# Patient Record
Sex: FEMALE | Race: BLACK OR AFRICAN AMERICAN | Employment: FULL TIME
[De-identification: names, ages, dates, MRNs, and addresses within clinical notes are randomized per-mention and may not be internally consistent; named-entity substitution may affect disease eponyms.]

---

## 2023-07-17 ENCOUNTER — HOSPITAL ENCOUNTER (OUTPATIENT)
Facility: HOSPITAL | Age: 63
Setting detail: RECURRING SERIES
Discharge: HOME | End: 2023-07-20
Payer: MEDICAID

## 2023-07-17 PROCEDURE — 97162 PT EVAL MOD COMPLEX 30 MIN: CPT

## 2023-07-17 NOTE — PROGRESS NOTES
PHYSICAL / OCCUPATIONAL THERAPY - DAILY TREATMENT NOTE (updated )  For Eval visit    Patient Name: Raúl No    Date: 2023    : 1960  Insurance: Payor: Barbara Re / Plan: Clare SAmos Castro / Product Type: *No Product type* /      Patient  verified yes     Visit #   Current / Total 1 12   Time   In / Out 2:30 3:00   Pain   In / Out 0 0   Subjective Functional Status/Changes: See POC     TREATMENT AREA =  Other low back pain [M54.59]    OBJECTIVE           30 min   Eval - untimed                      Therapeutic Procedures: Tx Min Billable or 1:1 Min (if diff from Tx Min) Procedure, Rationale, Specifics     77841 Therapeutic Exercise (timed):  increase ROM, strength, coordination, balance, and proprioception to improve patient's ability to progress to PLOF and address remaining functional goals. (see flow sheet as applicable)     Details if applicable:       36220 Neuromuscular Re-Education (timed):  improve balance, coordination, kinesthetic sense, posture, core stability and proprioception to improve patient's ability to develop conscious control of individual muscles and awareness of position of extremities in order to progress to PLOF and address remaining functional goals. (see flow sheet as applicable)     Details if applicable:       18562 Manual Therapy (timed):  decrease pain, increase ROM, increase tissue extensibility, and decrease trigger points to improve patient's ability to progress to PLOF and address remaining functional goals. The manual therapy interventions were performed at a separate and distinct time from the therapeutic activities interventions .  (see flow sheet as applicable)     Details if applicable:       94792 Self Care/Home Management (timed):  improve patient knowledge and understanding of pain reducing techniques, positioning, posture/ergonomics, home safety, activity modification, diagnosis/prognosis, and physical therapy expectations, procedures and

## 2023-07-17 NOTE — THERAPY EVALUATION
limited with pain, SB 50% limited bilat with pain, Rot R 75% limited with pain L 50% limited with pain. Strength: Hip Flexion 4+/5 bialt , ABD 4-/5 bilat, Extension 4-/5 bilat; TA draw poor  Current deficits include decreased spinal mobility with increased tissue tightness leading to pain with repetitive bending activities. Pt will benefit from a comprehensive POC/HEP to address impairments and restore function in order to return to prior level of function and prevent secondary impairments. Evaluation Complexity:  History:  HIGH Complexity :3+ comorbidities / personal factors will impact the outcome/ POC ; Examination:  HIGH Complexity : 4+ Standardized tests and measures addressing body structure, function, activity limitation and / or participation in recreation  ;Presentation:  MEDIUM Complexity : Evolving with changing characteristics  ; Clinical Decision Making:  MEDIUM Complexity : FOTO score of 26-74 FOTO score = an established functional score where 100 = no disability  Overall Complexity Rating: MEDIUM  Problem List: pain affecting function, decrease ROM, decrease strength, impaired gait/balance, decrease ADL/functional abilities, decrease activity tolerance, decrease flexibility/joint mobility, and decrease transfer abilities    Treatment Plan may include any combination of the followin Therapeutic Exercise, 49871 Neuromuscular Re-Education, 97744 Manual Therapy, 09106 Therapeutic Activity, 48026 Self Care/Home Management, 21413 Electrical Stim unattended, [id number] Electrical Stim attended, 88121 Vasopneumatic Device, [id number] Aquatic Therapy, [id number] Gait Training, [id number] Ultrasound, [id number] Mechanical Traction, [id number] Needle Insertion w/o Injection (1 or 2 muscles), 38267 Needle Insertion w/o Injection (3+ muscles), 76756 Canalith Repositioning, and 98829 Orthotic Management and Training  Patient / Family readiness to learn indicated by: asking questions, trying to perform skills, interest, return

## 2023-07-19 ENCOUNTER — HOSPITAL ENCOUNTER (OUTPATIENT)
Facility: HOSPITAL | Age: 63
Setting detail: RECURRING SERIES
Discharge: HOME | End: 2023-07-22
Payer: MEDICAID

## 2023-07-19 PROCEDURE — G0283 ELEC STIM OTHER THAN WOUND: HCPCS

## 2023-07-19 PROCEDURE — 97535 SELF CARE MNGMENT TRAINING: CPT

## 2023-07-19 PROCEDURE — 97110 THERAPEUTIC EXERCISES: CPT

## 2023-07-19 PROCEDURE — 97112 NEUROMUSCULAR REEDUCATION: CPT

## 2023-07-24 ENCOUNTER — HOSPITAL ENCOUNTER (OUTPATIENT)
Facility: HOSPITAL | Age: 63
Setting detail: RECURRING SERIES
Discharge: HOME | End: 2023-07-27
Payer: MEDICAID

## 2023-07-24 PROCEDURE — 97112 NEUROMUSCULAR REEDUCATION: CPT

## 2023-07-24 PROCEDURE — 97110 THERAPEUTIC EXERCISES: CPT

## 2023-07-24 PROCEDURE — G0283 ELEC STIM OTHER THAN WOUND: HCPCS

## 2023-07-24 PROCEDURE — 97530 THERAPEUTIC ACTIVITIES: CPT

## 2023-07-24 NOTE — PROGRESS NOTES
Instructed in lifting mechanics with pt requiring VC, demonstration, and TC. Good effort with session. Ended with MHP and estim per pt request.    Patient will continue to benefit from skilled PT / OT services to modify and progress therapeutic interventions, analyze and address functional mobility deficits, analyze and address ROM deficits, analyze and address strength deficits, analyze and address soft tissue restrictions, analyze and cue for proper movement patterns, analyze and modify for postural abnormalities, and instruct in home and community integration to address functional deficits and attain remaining goals. Progress toward goals / Updated goals:  []  See PN    Compliant with HEP.     PLAN  yes Continue plan of care  [x]  Upgrade activities as tolerated  []  Discharge due to :  []  Other:    Maritza Robison, PT    7/24/2023    12:25 PM    Future Appointments   Date Time Provider [address]   7/24/2023  3:00 PM Maritza Robison, PT [facility]   7/26/2023  2:20 PM Maritza Robison, PT [facility]   7/31/2023  3:00 PM Maritza [place], PT [facility]   8/2/2023  1:40 PM Lydia Aid, PTA [facility]   8/7/2023  2:20 PM Lydia Aid, PTA [facility]   8/10/2023  2:20 PM Lydia Aid, PTA [facility]   8/14/2023  2:20 PM Lydia Aid, PTA [facility]   8/16/2023  2:20 PM Lydia Aid, PTA [facility]   8/23/2023  2:20 PM Lydia Aid, PTA [address] [facility]

## 2023-07-26 ENCOUNTER — HOSPITAL ENCOUNTER (OUTPATIENT)
Facility: HOSPITAL | Age: 63
Setting detail: RECURRING SERIES
Discharge: HOME | End: 2023-07-29
Payer: MEDICAID

## 2023-07-26 PROCEDURE — 97530 THERAPEUTIC ACTIVITIES: CPT

## 2023-07-26 PROCEDURE — 97112 NEUROMUSCULAR REEDUCATION: CPT

## 2023-07-26 PROCEDURE — 97110 THERAPEUTIC EXERCISES: CPT

## 2023-07-26 PROCEDURE — G0283 ELEC STIM OTHER THAN WOUND: HCPCS

## 2023-07-26 NOTE — PROGRESS NOTES
and proprioception to improve patient's ability to progress to PLOF and address remaining functional goals. (see flow sheet as applicable)     Details if applicable:     8  04329 Neuromuscular Re-Education (timed):  improve balance, coordination, kinesthetic sense, posture, core stability and proprioception to improve patient's ability to develop conscious control of individual muscles and awareness of position of extremities in order to progress to PLOF and address remaining functional goals. (see flow sheet as applicable)     Details if applicable:     2  43023 Manual Therapy (timed):  decrease pain, increase ROM, increase tissue extensibility, and decrease trigger points to improve patient's ability to progress to PLOF and address remaining functional goals. The manual therapy interventions were performed at a separate and distinct time from the therapeutic activities interventions . (see flow sheet as applicable)     Details if applicable:  prone HVLAT to thoracic spine with 3x cavitation   10  48215 Therapeutic Activity (timed):  use of dynamic activities replicating functional movements to increase ROM, strength, coordination, balance, and proprioception in order to improve patient's ability to progress to PLOF and address remaining functional goals. (see flow sheet as applicable)     Details if applicable:   27  [facility] Totals Reminder: bill using total billable min of TIMED therapeutic procedures (example: do not include dry needle or estim unattended, both untimed codes, in totals to left)  8-22 min = 1 unit; 23-37 min = 2 units; 38-52 min = 3 units; 53-67 min = 4 units; 68-82 min = 5 units   Total Total     [x]  Patient Education billed concurrently with other procedures   [x] Review HEP    [] Progressed/Changed HEP, detail:    [] Other detail:       Objective Information/Functional Measures/Assessment    Good cavitation with HVLAT to t/s in prone. Exercise per flow sheet without adverse reactions.  Able to

## 2023-07-31 ENCOUNTER — HOSPITAL ENCOUNTER (OUTPATIENT)
Facility: HOSPITAL | Age: 63
Setting detail: RECURRING SERIES
Discharge: HOME | End: 2023-08-03
Payer: MEDICAID

## 2023-07-31 PROCEDURE — G0283 ELEC STIM OTHER THAN WOUND: HCPCS

## 2023-07-31 PROCEDURE — 97530 THERAPEUTIC ACTIVITIES: CPT

## 2023-07-31 PROCEDURE — 97112 NEUROMUSCULAR REEDUCATION: CPT

## 2023-07-31 PROCEDURE — 97110 THERAPEUTIC EXERCISES: CPT

## 2023-07-31 NOTE — PROGRESS NOTES
PHYSICAL / OCCUPATIONAL THERAPY - DAILY TREATMENT NOTE (updated )    Patient Name: Nasreen Lot    Date: 2023    : 1960  Insurance: Payor: Lucioramonemichele Moser / Plan: Clare SAmos Castro / Product Type: *No Product type* /      Patient  verified yes     Visit #   Current / Total 6 12   Time   In / Out 3:00 3:40   Pain   In / Out 2 0   Subjective Functional Status/Changes: Says her other knee hurts today. Say her back is feeling good; her daughter cracked her back last night. TREATMENT AREA =  Other low back pain [M54.59]    OBJECTIVE  Modalities Rationale:     decrease pain to improve patient's ability to progress to PLOF and address remaining functional goals. 10 min [x] Estim Unattended, type/location:  IFC thoracolumbar in prone                                    []  w/ice    [x]  w/heat    min [] Estim Attended, type/location:                                     []  w/US     []  w/ice    []  w/heat    []  TENS insruct      min []  Mechanical Traction: type/lbs                   []  pro   []  sup   []  int   []  cont    []  before manual    []  after manual    min []  Ultrasound, settings/location:      min []  Iontophoresis w/ dexamethasone, location:                                               []  take home patch       []  in clinic    min  unbill []  Ice     []  Heat    location/position:     min []  Paraffin,  details:     min []  Vasopneumatic Device, press/temp:     min []  Kiana Coley / Kee Do: If using vaso (only need to measure limb vaso being performed on)      pre-treatment girth :       post-treatment girth :       measured at (landmark location) :      min []  Other:    Skin assessment post-treatment (if applicable):    [x]  intact    [x]  redness- no adverse reaction                 []redness - adverse reaction:        Therapeutic Procedures:   Tx Min Billable or 1:1 Min (if diff from Tx Min) Procedure, Rationale, Specifics   10  12254 Therapeutic Exercise (timed):  increase ROM,

## 2023-08-02 ENCOUNTER — HOSPITAL ENCOUNTER (OUTPATIENT)
Facility: HOSPITAL | Age: 63
Setting detail: RECURRING SERIES
Discharge: HOME | End: 2023-08-05
Payer: MEDICAID

## 2023-08-02 PROCEDURE — 97110 THERAPEUTIC EXERCISES: CPT

## 2023-08-02 PROCEDURE — 97530 THERAPEUTIC ACTIVITIES: CPT

## 2023-08-02 PROCEDURE — G0283 ELEC STIM OTHER THAN WOUND: HCPCS

## 2023-08-02 PROCEDURE — 97112 NEUROMUSCULAR REEDUCATION: CPT

## 2023-08-02 NOTE — PROGRESS NOTES
PHYSICAL / OCCUPATIONAL THERAPY - DAILY TREATMENT NOTE (updated )    Patient Name: Laureano Trevizo    Date: 8/3/2023    : 1960  Insurance: Payor: Sophie Baron / Plan: Clare NAYE Mimssy / Product Type: *No Product type* /      Patient  verified yes     Visit #   Current / Total 6 12   Time   In / Out 1258  PM   Pain   In / Out 4/10 010   Subjective Functional Status/Changes: Patient reports having more pain around the bra line today due to just getting off work. Did some heavy lifting at work and could be why it's a little bothered. TREATMENT AREA =  Other low back pain [M54.59]    OBJECTIVE  Modalities Rationale:     decrease pain to improve patient's ability to progress to PLOF and address remaining functional goals. 10 min [x] Estim Unattended, type/location:  IFC thoracolumbar in prone                                    []  w/ice    [x]  w/heat    min [] Estim Attended, type/location:                                     []  w/US     []  w/ice    []  w/heat    []  TENS insruct      min []  Mechanical Traction: type/lbs                   []  pro   []  sup   []  int   []  cont    []  before manual    []  after manual    min []  Ultrasound, settings/location:      min []  Iontophoresis w/ dexamethasone, location:                                               []  take home patch       []  in clinic    min  unbill []  Ice     []  Heat    location/position:     min []  Paraffin,  details:     min []  Vasopneumatic Device, press/temp:     min []  Daphne Gracia / Georgina Krissy: If using vaso (only need to measure limb vaso being performed on)      pre-treatment girth :       post-treatment girth :       measured at (landmark location) :      min []  Other:    Skin assessment post-treatment (if applicable):    [x]  intact    [x]  redness- no adverse reaction                 []redness - adverse reaction:        Therapeutic Procedures:   Tx Min Billable or 1:1 Min (if diff from [employer]) Procedure, Rationale,

## 2023-08-07 ENCOUNTER — HOSPITAL ENCOUNTER (OUTPATIENT)
Facility: HOSPITAL | Age: 63
Setting detail: RECURRING SERIES
Discharge: HOME | End: 2023-08-10
Payer: MEDICAID

## 2023-08-07 PROCEDURE — 97530 THERAPEUTIC ACTIVITIES: CPT

## 2023-08-07 PROCEDURE — 97112 NEUROMUSCULAR REEDUCATION: CPT

## 2023-08-07 PROCEDURE — G0283 ELEC STIM OTHER THAN WOUND: HCPCS

## 2023-08-07 PROCEDURE — 97110 THERAPEUTIC EXERCISES: CPT

## 2023-08-07 NOTE — PROGRESS NOTES
PHYSICAL / OCCUPATIONAL THERAPY - DAILY TREATMENT NOTE (updated )    Patient Name: Letty López    Date: 2023    : 1960  Insurance: Payor: Seun Glass / Plan: Clare NAYE Castro / Product Type: *No Product type* /      Patient  verified yes     Visit #   Current / Total 7 12   Time   In / Out 213  PM   Pain   In / Out 3-4/10 1/10   Subjective Functional Status/Changes: It stays in that range of pain. But I can slowly feel it getting better. TREATMENT AREA =  Other low back pain [M54.59]    OBJECTIVE  Modalities Rationale:     decrease pain to improve patient's ability to progress to PLOF and address remaining functional goals. 10 min [x] Estim Unattended, type/location:  IFC thoracolumbar in prone                                    []  w/ice    [x]  w/heat    min [] Estim Attended, type/location:                                     []  w/US     []  w/ice    []  w/heat    []  TENS insruct      min []  Mechanical Traction: type/lbs                   []  pro   []  sup   []  int   []  cont    []  before manual    []  after manual    min []  Ultrasound, settings/location:      min []  Iontophoresis w/ dexamethasone, location:                                               []  take home patch       []  in clinic    min  unbill []  Ice     []  Heat    location/position:     min []  Paraffin,  details:     min []  Vasopneumatic Device, press/temp:     min []  Norman Riddles / Samantha Port: If using vaso (only need to measure limb vaso being performed on)      pre-treatment girth :       post-treatment girth :       measured at (landmark location) :      min []  Other:    Skin assessment post-treatment (if applicable):    [x]  intact    [x]  redness- no adverse reaction                 []redness - adverse reaction:        Therapeutic Procedures:   Tx Min Billable or 1:1 Min (if diff from Tx Min) Procedure, Rationale, Specifics   Total  44 Total  37 [facility] Totals Reminder: bill using total billable min of

## 2023-08-10 ENCOUNTER — APPOINTMENT (OUTPATIENT)
Facility: HOSPITAL | Age: 63
End: 2023-08-10
Payer: MEDICAID

## 2023-08-10 ENCOUNTER — HOSPITAL ENCOUNTER (OUTPATIENT)
Facility: HOSPITAL | Age: 63
Setting detail: RECURRING SERIES
Discharge: HOME | End: 2023-08-13
Payer: MEDICAID

## 2023-08-10 PROCEDURE — 97110 THERAPEUTIC EXERCISES: CPT

## 2023-08-10 PROCEDURE — 97112 NEUROMUSCULAR REEDUCATION: CPT

## 2023-08-10 PROCEDURE — 97530 THERAPEUTIC ACTIVITIES: CPT

## 2023-08-10 NOTE — PROGRESS NOTES
[facility] Totals Reminder: bill using total billable min of TIMED therapeutic procedures (example: do not include dry needle or estim unattended, both untimed codes, in totals to left)  8-22 min = 1 unit; 23-37 min = 2 units; 38-52 min = 3 units; 53-67 min = 4 units; 68-82 min = 5 units   15  93967 Therapeutic Exercise (timed):  increase ROM, strength, coordination, balance, and proprioception to improve patient's ability to progress to PLOF and address remaining functional goals. (see flow sheet as applicable)     Details if applicable:     10  61391 Neuromuscular Re-Education (timed):  improve balance, coordination, kinesthetic sense, posture, core stability and proprioception to improve patient's ability to develop conscious control of individual muscles and awareness of position of extremities in order to progress to PLOF and address remaining functional goals. (see flow sheet as applicable)     Details if applicable:       93266 Manual Therapy (timed):  decrease pain, increase ROM, increase tissue extensibility, and decrease trigger points to improve patient's ability to progress to PLOF and address remaining functional goals. The manual therapy interventions were performed at a separate and distinct time from the therapeutic activities interventions . (see flow sheet as applicable)     Details if applicable:    Position; prone with 1 pillow under hips  Technique: STM/MFR to TL junction; mid to distal T/S PA mobs   10  15816 Therapeutic Activity (timed):  use of dynamic activities replicating functional movements to increase ROM, strength, coordination, balance, and proprioception in order to improve patient's ability to progress to PLOF and address remaining functional goals.   (see flow sheet as applicable)     Details if applicable:     [x]  Patient Education billed concurrently with other procedures   [x] Review HEP    [] Progressed/Changed HEP, detail:    [] Other detail:       Objective Information/Functional

## 2023-08-14 ENCOUNTER — HOSPITAL ENCOUNTER (OUTPATIENT)
Facility: HOSPITAL | Age: 63
Setting detail: RECURRING SERIES
Discharge: HOME | End: 2023-08-17
Payer: MEDICAID

## 2023-08-14 PROCEDURE — 97112 NEUROMUSCULAR REEDUCATION: CPT

## 2023-08-14 PROCEDURE — 97530 THERAPEUTIC ACTIVITIES: CPT

## 2023-08-14 PROCEDURE — 97110 THERAPEUTIC EXERCISES: CPT

## 2023-08-14 PROCEDURE — G0283 ELEC STIM OTHER THAN WOUND: HCPCS

## 2023-08-14 NOTE — PROGRESS NOTES
PHYSICAL / OCCUPATIONAL THERAPY - DAILY TREATMENT NOTE (updated )    Patient Name: Nasreen Lot    Date: 2023    : 1960  Insurance: Payor: Lucioramonemichele Shanti / Plan: Clare S. Gloria / Product Type: *No Product type* /      Patient  verified yes     Visit #   Current / Total 9 12   Time   In / Out 225  PM   Pain   In / Out 3-4/10 1-2/10   Subjective Functional Status/Changes: Patient reports just getting off work. Has been lifting with her legs more than her back and has been feeling better       TREATMENT AREA =  Other low back pain [M54.59]    OBJECTIVE  Modalities Rationale:     decrease pain to improve patient's ability to progress to PLOF and address remaining functional goals. 10 min [x] Estim Unattended, type/location:  IFC thoracolumbar in prone                                    [x]  w/ice    []  w/heat    min [] Estim Attended, type/location:                                     []  w/US     []  w/ice    []  w/heat    []  TENS insruct      min []  Mechanical Traction: type/lbs                   []  pro   []  sup   []  int   []  cont    []  before manual    []  after manual    min []  Ultrasound, settings/location:      min []  Iontophoresis w/ dexamethasone, location:                                               []  take home patch       []  in clinic    min  unbill []  Ice     []  Heat    location/position:     min []  Paraffin,  details:     min []  Vasopneumatic Device, press/temp:     min []  Kiana Coley / Kee Do: If using vaso (only need to measure limb vaso being performed on)      pre-treatment girth :       post-treatment girth :       measured at (landmark location) :      min []  Other:    Skin assessment post-treatment (if applicable):    [x]  intact    [x]  redness- no adverse reaction                 []redness - adverse reaction:        Therapeutic Procedures:   Tx Min Billable or 1:1 Min (if diff from Tx Min) Procedure, Rationale, Specifics   Total  36 Total  29 [facility]

## 2023-08-16 ENCOUNTER — HOSPITAL ENCOUNTER (OUTPATIENT)
Facility: HOSPITAL | Age: 63
Setting detail: RECURRING SERIES
Discharge: HOME | End: 2023-08-19
Payer: MEDICAID

## 2023-08-16 PROCEDURE — 97110 THERAPEUTIC EXERCISES: CPT

## 2023-08-16 PROCEDURE — 97530 THERAPEUTIC ACTIVITIES: CPT

## 2023-08-16 PROCEDURE — G0283 ELEC STIM OTHER THAN WOUND: HCPCS

## 2023-08-16 PROCEDURE — 97112 NEUROMUSCULAR REEDUCATION: CPT

## 2023-08-16 PROCEDURE — 97535 SELF CARE MNGMENT TRAINING: CPT

## 2023-08-16 NOTE — THERAPY DISCHARGE
the last 2 weeks, pain has ranged between 0-5/10 and an avg pain of 3/10; symptoms primarily described as soreness around the thoracolumbar junction. Patient able to complete work duties and heavy household activities with decrease pain. Pt also reports even when the symptoms occur, the intensity of the pain isn't as bad. Patient is now discharged from physical therapy due to progressing towards/meeting PT goals and demonstrating good understanding of self management of symptoms. PROGRESS TOWARDS GOALS  Improve FOTO score to 56/100 to show a significant functional change. Status at last Eval: 47/100  Current Status: 70/100  Goal Met?  yes     2. Pt to report decreased max pain levels less than 3/10 for improvement in QoL. Status at last Eval: 10/10  Current Status: 5/10  Goal Met?  progressing     3. Pt to demonstrate good RDL mechanics to improve lifting ability with little pain on knees. Status at last Eval: poor mechanics, difficult and painful in the knees  Current Status: good mechanics, minimal pain in the knees  Goal Met?  yes      RECOMMENDATIONS  Discontinue therapy. Progressing towards or have reached established goals. Thank you for this referral.     Dayanna Rachel, PTA       8/16/2023       3:35 PM    Payor: Mercedes Rush / Plan: [address] / Product Type: *No Product type* /      Medicaid Ins, signature required for DC ** NOTE TO PHYSICIAN:  Your patient's insurance requires this discharge note be signed and returned **    ___ I have read the above report and request that my patient be discharged from therapy.      Physician's Signature:_________________________   DATE:_________   TIME:________                           Racquel Chapa DO    ** Signature, Date and Time must be completed for valid certification **  Please sign and fax to [facility]

## 2023-08-23 ENCOUNTER — APPOINTMENT (OUTPATIENT)
Facility: HOSPITAL | Age: 63
End: 2023-08-23
Payer: MEDICAID

## 2023-09-08 ENCOUNTER — HOSPITAL ENCOUNTER (OUTPATIENT)
Facility: HOSPITAL | Age: 63
Setting detail: RECURRING SERIES
Discharge: HOME | End: 2023-09-11
Payer: MEDICAID

## 2023-09-08 PROCEDURE — 97162 PT EVAL MOD COMPLEX 30 MIN: CPT

## 2023-09-08 NOTE — THERAPY EVALUATION
[] Min   [] Mod   [] Severe    (R) Tightness= [] WNL   [] Min   [x] Mod   [] Severe  Gastroc:      (L) Tightness= [x] WNL   [] Min   [] Mod   [] Severe    (R) Tightness= [] WNL   [] Min   [x] Mod   [] Severe            Other tests/comments: Steri-strips intact over incision    20 min [x]Eval  - untimed                 Therapeutic Procedures: Tx Min Billable or 1:1 Min (if diff from Tx Min) Procedure, Rationale, Specifics   5  43808 Therapeutic Exercise (timed):  increase ROM, strength, coordination, balance, and proprioception to improve patient's ability to progress to PLOF and address remaining functional goals. (see flow sheet as applicable)     Details if applicable: Total  5 Total Reminder: [facility]/BC bill using total billable min of TIMED therapeutic procedures (example: do not include dry needle or estim unattended, both untimed codes, in totals to left)     [x]  Patient Education billed concurrently with other procedures   Access Code: [email or web address]  URL: [email or web address]  Date: 09/08/2023  Prepared by: José Luis Small    Exercises  - Supine Quadricep Sets  - 1 x daily - 7 x weekly - 1 sets - 10 reps - 5 second hold  - Supine Heel Slide with Strap  - 1 x daily - 7 x weekly - 1 sets - 10 reps - 5 second hold  - Seated Long Arc Quad  - 1 x daily - 7 x weekly - 1 sets - 10 reps - 5 second hold    ASSESSMENT    Patient will continue to benefit from skilled PT services: see plan of care     [x]  See Plan of Care for goals and reassessment       PLAN  [x]  Upgrade activities as tolerated     [x]  Continue plan of care  []  Update interventions per flow sheet       []  Other:_      José Luis Small, PT 9/8/2023  7:57 AM    Justification for Eval Code Complexity:  Patient History : HIGH - h/o right knee arthroscopy x 2, arthritis, back pain, HTN  Examination see exam HIGH  Clinical Presentation: MEDIUM  Clinical Decision Making : HIGH - FOTO complexity level

## 2023-09-08 NOTE — THERAPY EVALUATION
(L) Tightness= [x] WNL   [] Min   [] Mod   [] Severe                       (R) Tightness= [] WNL   [] Min   [x] Mod   [] Severe  Quadriceps:               (L) Tightness= [x] WNL   [] Min   [] Mod   [] Severe                       (R) Tightness= [] WNL   [] Min   [x] Mod   [] Severe  Gastroc:                 (L) Tightness= [x] WNL   [] Min   [] Mod   [] Severe                       (R) Tightness= [] WNL   [] Min   [x] Mod   [] Severe    FOTO: 21/100, stage 1, restricted ambulation    Evaluation Complexity:  History:  HIGH Complexity :3+ comorbidities / personal factors will impact the outcome/ POC ; Examination:  HIGH Complexity : 4+ Standardized tests and measures addressing body structure, function, activity limitation and / or participation in recreation  ;Presentation:  MEDIUM Complexity : Evolving with changing characteristics  ; Clinical Decision Making:  Other outcome measures FOTO complexity level  HIGH  FOTO score = an established functional score where 100 = no disability  Overall Complexity Rating: HIGH   Problem List: pain affecting function, decrease ROM, decrease strength, impaired gait/balance, decrease ADL/functional abilities, decrease activity tolerance, decrease flexibility/joint mobility, and decrease transfer abilities    Treatment Plan may include any combination of the followin Therapeutic Exercise, 57108 Neuromuscular Re-Education, 85744 Manual Therapy, 27930 Therapeutic Activity, 04806 Self Care/Home Management, 98871 Electrical Stim unattended, 36860 Electrical Stim attended, 75295 Vasopneumatic Device  (Vasopnuematic compression justification:  Per bilateral girth measures taken and listed above the edema is considered significant and having an impact on the patient's strength, balance, gait, transfers, self care, and ADL's), and 31968 Gait Training  Patient / Family readiness to learn indicated by: asking questions, trying to perform skills, interest, return verbalization , and

## 2023-09-11 ENCOUNTER — HOSPITAL ENCOUNTER (OUTPATIENT)
Facility: HOSPITAL | Age: 63
Setting detail: RECURRING SERIES
Discharge: HOME | End: 2023-09-14
Payer: MEDICAID

## 2023-09-11 PROCEDURE — 97110 THERAPEUTIC EXERCISES: CPT

## 2023-09-11 PROCEDURE — 97112 NEUROMUSCULAR REEDUCATION: CPT

## 2023-09-11 PROCEDURE — 97116 GAIT TRAINING THERAPY: CPT

## 2023-09-11 PROCEDURE — 97530 THERAPEUTIC ACTIVITIES: CPT

## 2023-09-11 NOTE — PROGRESS NOTES
PHYSICAL / OCCUPATIONAL THERAPY - DAILY TREATMENT NOTE (updated )    Patient Name: Lazara Walker    Date: 2023    : 1960  Insurance: Payor: Francesco Rojas / Plan:  S. Gloria / Product Type: *No Product type* /      Patient  verified Yes     Visit #   Current / Total 2 18   Time   In / Out 8:40 9:32   Pain   In / Out 5 4   Subjective Functional Status/Changes: Pt reports stiffness right knee, reports that she stopped taking the Oxycodone. Next PN/ RC due 10/8/2023  Auth due JONATHAN, dean Mark, 2023    TREATMENT AREA =  Pain in right knee [M25.561]    OBJECTIVE    Modalities Rationale:     decrease pain to improve patient's ability to progress to PLOF and address remaining functional goals. min [] Estim Unattended, type/location:                                      []  w/ice    []  w/heat    min [] Estim Attended, type/location:                                     []  w/US     []  w/ice    []  w/heat    []  TENS insruct      min []  Mechanical Traction: type/lbs                   []  pro   []  sup   []  int   []  cont    []  before manual    []  after manual    min []  Ultrasound, settings/location:     10 min  unbill [x]  Ice     []  Heat    location/position: Right knee, supine    min []  Paraffin,  details:     min []  Vasopneumatic Device, press/temp:     min []  Martin Dobson / Rajan Sue: If using vaso (only need to measure limb vaso being performed on)      pre-treatment girth :       post-treatment girth :       measured at (landmark location) :      min []  Other:    Skin assessment post-treatment:   Intact      Therapeutic Procedures: Tx Min Billable or 1:1 Min (if diff from Tx Min) Procedure, Rationale, Specifics   16  88562 Therapeutic Exercise (timed):  increase ROM, strength, coordination, balance, and proprioception to improve patient's ability to progress to PLOF and address remaining functional goals.  (see flow sheet as applicable)     Details if applicable:  Stand knee

## 2023-09-13 ENCOUNTER — HOSPITAL ENCOUNTER (OUTPATIENT)
Facility: HOSPITAL | Age: 63
Setting detail: RECURRING SERIES
Discharge: HOME | End: 2023-09-16
Payer: MEDICAID

## 2023-09-13 PROCEDURE — 97530 THERAPEUTIC ACTIVITIES: CPT

## 2023-09-13 PROCEDURE — 97016 VASOPNEUMATIC DEVICE THERAPY: CPT

## 2023-09-13 PROCEDURE — 97110 THERAPEUTIC EXERCISES: CPT

## 2023-09-13 PROCEDURE — 97116 GAIT TRAINING THERAPY: CPT

## 2023-09-13 NOTE — PROGRESS NOTES
PHYSICAL / OCCUPATIONAL THERAPY - DAILY TREATMENT NOTE (updated )    Patient Name: Vonda Bourne    Date: 2023    : 1960  Insurance: Payor: Oseas Campos / Plan: Clare Castro / Product Type: *No Product type* /      Patient  verified yes     Visit #   Current / Total 3 18   Time   In / Out 9:20 10:20   Pain   In / Out 4 4   Subjective Functional Status/Changes: \"It's just sore. \" Pt reports no change in pain after last treatment session. Pt states she has been working on the exercises at home, lifting her legs out to the side and the heel slides are hard. Next PN/ RC due 10/8/2023  Auth due JONATHAN, med [place], 2023    TREATMENT AREA =  Pain in right knee [M25.561]    OBJECTIVE    Vasopneumatic Device (UNTIMED), press/temp: MED/32 DEGREES    Involved limb - Pre-Treatment Girth: 49.5 cm   Post-Treatment Girth:48.1 cm  [place]: superior patella  -Pt supine with LE wedge   Min Rationale   10 decrease edema, decrease inflammation, and decrease pain to improve patient's ability to progress to PLOF and address remaining functional goals. Skin assessment post-treatment:   Intact     Therapeutic Procedures: Tx Min Billable or 1:1 Min (if diff from Tx Min) Procedure, Rationale, Specifics   17  60741 Therapeutic Exercise (timed):  increase ROM, strength, coordination, balance, and proprioception to improve patient's ability to progress to PLOF and address remaining functional goals. (see flow sheet as applicable)     Details if applicable:       15  94783 Therapeutic Activity (timed):  use of dynamic activities replicating functional movements to increase ROM, strength, coordination, balance, and proprioception in order to improve patient's ability to progress to PLOF and address remaining functional goals. (see flow sheet as applicable)     Details if applicable:     8  49040 Gait Training (timed): To improve safety and dynamic movement with household/community ambulation.   (see flow

## 2023-09-15 ENCOUNTER — HOSPITAL ENCOUNTER (OUTPATIENT)
Facility: HOSPITAL | Age: 63
Setting detail: RECURRING SERIES
Discharge: HOME | End: 2023-09-18
Payer: MEDICAID

## 2023-09-15 PROCEDURE — 97112 NEUROMUSCULAR REEDUCATION: CPT

## 2023-09-15 PROCEDURE — 97110 THERAPEUTIC EXERCISES: CPT

## 2023-09-15 PROCEDURE — 97016 VASOPNEUMATIC DEVICE THERAPY: CPT

## 2023-09-15 PROCEDURE — 97530 THERAPEUTIC ACTIVITIES: CPT

## 2023-09-18 ENCOUNTER — HOSPITAL ENCOUNTER (OUTPATIENT)
Facility: HOSPITAL | Age: 63
Setting detail: RECURRING SERIES
Discharge: HOME | End: 2023-09-21
Payer: MEDICAID

## 2023-09-18 PROCEDURE — 97530 THERAPEUTIC ACTIVITIES: CPT

## 2023-09-18 PROCEDURE — 97016 VASOPNEUMATIC DEVICE THERAPY: CPT

## 2023-09-18 PROCEDURE — 97110 THERAPEUTIC EXERCISES: CPT

## 2023-09-18 PROCEDURE — 97112 NEUROMUSCULAR REEDUCATION: CPT

## 2023-09-18 NOTE — PROGRESS NOTES
Progress Note/Recertification    Progressing toward goals:  Short Term Goals: To be accomplished in 3 weeks   Patient will demonstrate compliance with HEP. Status at IE HEP reviewed at eval  Long Term Goals: To be accomplished in 6 weeks   Patient will demonstrate independence with HEP. Status at IE HEP reviewed at eval  2. Patient will demonstrate greater than or equal to 120 degrees right knee flexion AROM to facilitate ADLs/IADLs. Status at IE 85 degrees  Current: right knee AROM: 5-115 degrees today. 3.  Patient will demonstrate 0 degrees right knee extension AROM to facilitate normal gait pattern. Status at IE 10 degrees (lacking)  4. Patient will score greater than or equal to 41/100 on FOTO, placing her in stage 2, household ambulation.   Status at IE 21/100, stage 1, restricted ambulation  Current: Cont step-ups     PLAN  Yes  Continue plan of care  [x]  Upgrade activities as tolerated  []  Discharge due to :  []  Other:    Jose L Buckner, PT    9/18/2023    8:46 AM    Future Appointments   Date Time Provider [address]   9/20/2023  8:40 AM Jose L Buckner, PT [facility]   9/22/2023  9:20 AM Sydelle Frankel, PTA [facility]   9/26/2023  9:20 AM Jose L Buckner, PT [facility]   9/27/2023  9:20 AM Sydelle Frankel, PTA [facility]   9/29/2023 10:00 AM Jose L Buckner PT [facility]   10/2/2023  9:20 AM Sydelle Frankel, PTA [facility]   10/4/2023 10:00 AM Jose L Buckner PT [facility]   10/6/2023 10:00 AM Jose L Buckner PT [facility]   10/10/2023 10:00 AM Sydelle Frankel, PTA [facility]   10/11/2023 10:00 AM Sydelle Frankel, PTA [facility]   10/13/2023 10:00 AM Sydelle Frankel, PTA [facility]   10/16/2023 10:00 AM Sydelle Frankel, PTA [facility]   10/18/2023 10:00 AM Jose L Buckner, PT [facility]   10/20/2023 10:00 AM Jose L Buckner, PT [facility]

## 2023-09-20 ENCOUNTER — HOSPITAL ENCOUNTER (OUTPATIENT)
Facility: HOSPITAL | Age: 63
Setting detail: RECURRING SERIES
Discharge: HOME | End: 2023-09-23
Payer: MEDICAID

## 2023-09-20 PROCEDURE — 97530 THERAPEUTIC ACTIVITIES: CPT

## 2023-09-20 PROCEDURE — 97110 THERAPEUTIC EXERCISES: CPT

## 2023-09-20 PROCEDURE — 97016 VASOPNEUMATIC DEVICE THERAPY: CPT

## 2023-09-20 PROCEDURE — 97112 NEUROMUSCULAR REEDUCATION: CPT

## 2023-09-20 NOTE — PROGRESS NOTES
PHYSICAL / OCCUPATIONAL THERAPY - DAILY TREATMENT NOTE (updated )    Patient Name: Nasreen Lot    Date: 2023    : 1960  Insurance: Payor: Mala Moser / Plan: Clare S. Gloria / Product Type: *No Product type* /      Patient  verified Yes     Visit #   Current / Total 6 18   Time   In / Out 8:41 9:33   Pain   In / Out 2-3 0   Subjective Functional Status/Changes: Pt reports 2-3/10 pain and stiffness. Next PN/ RC due 10/8/2023  Auth due JONATHAN, dean [place]ley [place], 2023    TREATMENT AREA =  Pain in right knee [M25.561]    OBJECTIVE    Modalities Rationale:     decrease edema and decrease pain to improve patient's ability to progress to PLOF and address remaining functional goals. min [] Estim Unattended, type/location:                                      []  w/ice    []  w/heat    min [] Estim Attended, type/location:                                     []  w/US     []  w/ice    []  w/heat    []  TENS insruct      min []  Mechanical Traction: type/lbs                   []  pro   []  sup   []  int   []  cont    []  before manual    []  after manual    min []  Ultrasound, settings/location:      min  unbill []  Ice     []  Heat    location/position:     min []  Paraffin,  details:    10 min [x]  Vasopneumatic Device, press/temp: Low pressure, 34 deg, right knee, supine with wedge under right LE    min []  Kiana Coley / Kee Do: If using vaso (only need to measure limb vaso being performed on)      pre-treatment girth : 45.0 cm      post-treatment girth : 45.0 cm      measured at (landmark location) :  mid-patella    min []  Other:    Skin assessment post-treatment:   Intact      Therapeutic Procedures: Tx Min Billable or 1:1 Min (if diff from Tx Min) Procedure, Rationale, Specifics   20  40436 Therapeutic Exercise (timed):  increase ROM, strength, coordination, balance, and proprioception to improve patient's ability to progress to PLOF and address remaining functional goals.  (see flow sheet as

## 2023-09-22 ENCOUNTER — HOSPITAL ENCOUNTER (OUTPATIENT)
Facility: HOSPITAL | Age: 63
Setting detail: RECURRING SERIES
Discharge: HOME | End: 2023-09-25
Payer: MEDICAID

## 2023-09-22 PROCEDURE — 97112 NEUROMUSCULAR REEDUCATION: CPT

## 2023-09-22 PROCEDURE — 97110 THERAPEUTIC EXERCISES: CPT

## 2023-09-22 PROCEDURE — 97530 THERAPEUTIC ACTIVITIES: CPT

## 2023-09-22 NOTE — PROGRESS NOTES
PHYSICAL / OCCUPATIONAL THERAPY - DAILY TREATMENT NOTE (updated )    Patient Name: Marilyn Jacobo    Date: 2023    : 1960  Insurance: Payor: Alexander Re / Plan: Clare S. Gloria / Product Type: *No Product type* /      Patient  verified yes     Visit #   Current / Total 7 18   Time   In / Out 9:20 10:08   Pain   In / Out 2-3 2/10   Subjective Functional Status/Changes: Pt reports she has been trying to walk with the cane some at home. Pt c/o something feeling like it's pulling on the outside of her right knee. Next PN/ RC due 10/8/2023  Auth due JONATHAN, dean Sanches, 2023    TREATMENT AREA =  Pain in right knee [M25.561]    OBJECTIVE    Ice (UNBILLED):  location/position: CP to right knee, pt supine    (1' right LE extended, then added LE wedge for remaining 4')  Min Rationale   5 decrease inflammation and decrease pain to improve patient's ability to progress to PLOF and address remaining functional goals. Skin assessment post-treatment:   Intact     Therapeutic Procedures: Tx Min Billable or 1:1 Min (if diff from Tx Min) Procedure, Rationale, Specifics   10  02010 Therapeutic Exercise (timed):  increase ROM, strength, coordination, balance, and proprioception to improve patient's ability to progress to PLOF and address remaining functional goals. (see flow sheet as applicable)     Details if applicable:       18  14117 Therapeutic Activity (timed):  use of dynamic activities replicating functional movements to increase ROM, strength, coordination, balance, and proprioception in order to improve patient's ability to progress to PLOF and address remaining functional goals.   (see flow sheet as applicable)     Details if applicable:     15  68225 Neuromuscular Re-Education (timed):  improve balance, coordination, kinesthetic sense, posture, core stability and proprioception to improve patient's ability to develop conscious control of individual muscles and awareness of position of

## 2023-09-26 ENCOUNTER — HOSPITAL ENCOUNTER (OUTPATIENT)
Facility: HOSPITAL | Age: 63
Setting detail: RECURRING SERIES
Discharge: HOME | End: 2023-09-29
Payer: MEDICAID

## 2023-09-26 PROCEDURE — 97116 GAIT TRAINING THERAPY: CPT

## 2023-09-26 PROCEDURE — 97530 THERAPEUTIC ACTIVITIES: CPT

## 2023-09-26 PROCEDURE — 97112 NEUROMUSCULAR REEDUCATION: CPT

## 2023-09-26 PROCEDURE — 97110 THERAPEUTIC EXERCISES: CPT

## 2023-09-26 NOTE — PROGRESS NOTES
PHYSICAL / OCCUPATIONAL THERAPY - DAILY TREATMENT NOTE (updated )    Patient Name: Irish Bravo    Date: 2023    : 1960  Insurance: Payor: Boaz Garcia / Plan: Clare SAmos Gloria / Product Type: *No Product type* /      Patient  verified Yes     Visit #   Current / Total 8 18   Time   In / Out 9:20 10:10   Pain   In / Out 3-4 2-3   Subjective Functional Status/Changes: Pt reports that her knee is still sore. Next PN/ RC due 10/8/2023  Auth due JONATHAN, dean Bor[place] Rox, 2023    TREATMENT AREA =  Pain in right knee [M25.561]    OBJECTIVE    Modalities Rationale:     decrease pain to improve patient's ability to progress to PLOF and address remaining functional goals. min [] Estim Unattended, type/location:                                      []  w/ice    []  w/heat    min [] Estim Attended, type/location:                                     []  w/US     []  w/ice    []  w/heat    []  TENS insruct      min []  Mechanical Traction: type/lbs                   []  pro   []  sup   []  int   []  cont    []  before manual    []  after manual    min []  Ultrasound, settings/location:     10 min  unbill [x]  Ice     []  Heat    location/position: Right knee supine with wedge under right LE    min []  Paraffin,  details:     min []  Vasopneumatic Device, press/temp:     min []  Arleene Coats / Harlene : If using vaso (only need to measure limb vaso being performed on)      pre-treatment girth :       post-treatment girth :       measured at (landmark location) :      min []  Other:    Skin assessment post-treatment:   Intact      Therapeutic Procedures: Tx Min Billable or 1:1 Min (if diff from Tx Min) Procedure, Rationale, Specifics   10  32118 Therapeutic Exercise (timed):  increase ROM, strength, coordination, balance, and proprioception to improve patient's ability to progress to PLOF and address remaining functional goals.  (see flow sheet as applicable)     Details if applicable:       12  95006

## 2023-09-27 ENCOUNTER — HOSPITAL ENCOUNTER (OUTPATIENT)
Facility: HOSPITAL | Age: 63
Setting detail: RECURRING SERIES
Discharge: HOME | End: 2023-09-30
Payer: MEDICAID

## 2023-09-27 PROCEDURE — 97112 NEUROMUSCULAR REEDUCATION: CPT

## 2023-09-27 PROCEDURE — 97016 VASOPNEUMATIC DEVICE THERAPY: CPT

## 2023-09-27 PROCEDURE — 97110 THERAPEUTIC EXERCISES: CPT

## 2023-09-27 PROCEDURE — 97530 THERAPEUTIC ACTIVITIES: CPT

## 2023-09-27 NOTE — PROGRESS NOTES
PHYSICAL / OCCUPATIONAL THERAPY - DAILY TREATMENT NOTE (updated )    Patient Name: Cari Cunha    Date: 2023    : 1960  Insurance: Payor: Ashley Bowen / Plan: Clare SAmos Gloria / Product Type: *No Product type* /      Patient  verified yes     Visit #   Current / Total 9 18   Time   In / Out 9:20 10:09   Pain   In / Out 2-3 2-3   Subjective Functional Status/Changes: Pt reports she has a doctor follow up on Oct 11th. Pt states she is seeing a little improvement in her pain level. Next PN/ RC due 10/8/2023  Auth due JONATHAN, med Tad Failing, 2023    TREATMENT AREA =  Pain in right knee [M25.561]    OBJECTIVE    Vasopneumatic Device (UNTIMED), press/temp: medium pressure    Involved limb - Pre-Treatment Girth: 46.7 cm  Post-Treatment Girth: 46 cm  [place]: superior patella      Min Rationale   10 decrease edema, decrease inflammation, and decrease pain to improve patient's ability to progress to PLOF and address remaining functional goals. Skin assessment post-treatment:   Intact     Therapeutic Procedures: Tx Min Billable or 1:1 Min (if diff from Tx Min) Procedure, Rationale, Specifics   17  43347 Therapeutic Exercise (timed):  increase ROM, strength, coordination, balance, and proprioception to improve patient's ability to progress to PLOF and address remaining functional goals. (see flow sheet as applicable)     Details if applicable:       10  04381 Therapeutic Activity (timed):  use of dynamic activities replicating functional movements to increase ROM, strength, coordination, balance, and proprioception in order to improve patient's ability to progress to PLOF and address remaining functional goals.   (see flow sheet as applicable)     Details if applicable:     12  06536 Neuromuscular Re-Education (timed):  improve balance, coordination, kinesthetic sense, posture, core stability and proprioception to improve patient's ability to develop conscious control of individual muscles and

## 2023-09-29 ENCOUNTER — HOSPITAL ENCOUNTER (OUTPATIENT)
Facility: HOSPITAL | Age: 63
Setting detail: RECURRING SERIES
End: 2023-09-29
Payer: MEDICAID

## 2023-09-29 PROCEDURE — 97530 THERAPEUTIC ACTIVITIES: CPT

## 2023-09-29 PROCEDURE — 97110 THERAPEUTIC EXERCISES: CPT

## 2023-09-29 PROCEDURE — 97112 NEUROMUSCULAR REEDUCATION: CPT

## 2023-09-29 PROCEDURE — 97116 GAIT TRAINING THERAPY: CPT

## 2023-09-29 NOTE — PROGRESS NOTES
[address] [facility],[address] [facility]  [address] [place] [address]   Phone: [phone] Fax: [phone]  PROGRESS NOTE  Patient Name: Katya Connor : 1960   Treatment/Medical Diagnosis: M25.561  RIGHT KNEE PAIN      Referral Source: Ariel Dailey DO     Date of Initial Visit: 2023 Attended Visits: 10 Missed Visits: 0     SUMMARY OF TREATMENT  Treatment has consisted of initial evaluation and 9 treatment sessions, which have included ROM/stretching/strengthening exercises, balance training, functional mobility/gait training and patient education (including HEP). CURRENT STATUS  Patient has progressed well with exercises in clinic and demonstrates compliance with HEP. FOTO has improved from 21/100, stage 1, restricted ambulation to 49/100, stage 3, community ambulation. Patient is ambulating with RW in [facility] in home with normalized gait pattern. ROM/strength below. Patient denies pain this session.     ROM / Strength  [] Unable to assess                  AROM                      PROM                   Strength (1-5)      Left Right Left Right Left Right   Hip Flexion [facility] [place] WFL     5 4     Extension [facility] [place] WFL     NT NT     Abduction [facility] [place] WFL     5 5     Adduction WFL WFL     NT NT   Knee Flexion 135 115   120 5 4     Extension 0 0   0 5 5   Ankle Plantarflexion WFL WFL     5 5     Dorsiflexion WFL WFL     5 5      Flexibility: [] Unable to assess at this time  Hamstrings:               (L) Tightness= [x] WNL   [] Min   [] Mod   [] Severe                       (R) Tightness= [x] WNL   [] Min   [] Mod   [] Severe  Quadriceps:               (L) Tightness= [x] WNL   [] Min   [] Mod   [] Severe                       (R) Tightness= [] WNL   [x] Min   [] Mod   [] Severe  Gastroc:                 (L) Tightness= [x] WNL   [] Min   [] Mod   [] Severe                       (R) Tightness= [x] WNL   [] Min   [] Mod   [] Severe     Current Goals:  Short Term

## 2023-09-29 NOTE — PROGRESS NOTES
PHYSICAL / OCCUPATIONAL THERAPY - DAILY TREATMENT NOTE (updated )    Patient Name: Marilyn Jacobo    Date: 2023    : 1960  Insurance: Payor: Alexander Re / Plan: [address] / Product Type: *No Product type* /      Patient  verified Yes     Visit #   Current / Total 10 18   Time   In / Out 10:02 10:57   Pain   In / Out 0 0   Subjective Functional Status/Changes: Pt reports that she took a pain pill before PT today. Next PN/ RC due 10/29/2023  Auth due JONATHAN, dean Sanches, 2023    TREATMENT AREA =  Pain in right knee [M25.561]    OBJECTIVE    Modalities Rationale:     decrease pain to improve patient's ability to progress to PLOF and address remaining functional goals. min [] Estim Unattended, type/location:                                      []  w/ice    []  w/heat    min [] Estim Attended, type/location:                                     []  w/US     []  w/ice    []  w/heat    []  TENS insruct      min []  Mechanical Traction: type/lbs                   []  pro   []  sup   []  int   []  cont    []  before manual    []  after manual    min []  Ultrasound, settings/location:     10 min  unbill [x]  Ice     []  Heat    location/position: Right knee, supine with wedge under B LE    min []  Paraffin,  details:     min []  Vasopneumatic Device, press/temp:     min []  Marybel Nab / [place] Blind: If using vaso (only need to measure limb vaso being performed on)      pre-treatment girth :       post-treatment girth :       measured at (landmark location) :      min []  Other:    Skin assessment post-treatment:   Intact      Therapeutic Procedures: Tx Min Billable or 1:1 Min (if diff from Tx Min) Procedure, Rationale, Specifics   19  32898 Therapeutic Exercise (timed):  increase ROM, strength, coordination, balance, and proprioception to improve patient's ability to progress to PLOF and address remaining functional goals.  (see flow sheet as applicable)     Details if applicable:       8 02460 Neuromuscular Re-Education (timed):  improve balance, coordination, kinesthetic sense, posture, core stability and proprioception to improve patient's ability to develop conscious control of individual muscles and awareness of position of extremities in order to progress to PLOF and address remaining functional goals. (see flow sheet as applicable)     Details if applicable:     10  15358 Therapeutic Activity (timed):  use of dynamic activities replicating functional movements to increase ROM, strength, coordination, balance, and proprioception in order to improve patient's ability to progress to PLOF and address remaining functional goals. (see flow sheet as applicable)     Details if applicable:     8  71926 Gait Training (timed):   to improve safety and dynamic movement with household/community ambulation.   (see flow sheet as applicable)       Details if applicable:  Gait t/o clinic with RW and SPC        39  [address] [facility] Totals Reminder: bill using total billable min of TIMED therapeutic procedures (example: do not include dry needle or estim unattended, both untimed codes, in totals to left)  8-22 min = 1 unit; 23-37 min = 2 units; 38-52 min = 3 units; 53-67 min = 4 units; 68-82 min = 5 units   Total Total     [x]  Patient Education billed concurrently with other procedures   [x] Review HEP    [] Progressed/Changed HEP, detail:    [] Other detail:       Objective Information/Functional Measures/Assessment    Exercises per flow sheet    Gait:                [x] Normal    [] Abnormal    [] Antalgic    [] NWB    Device: RW and SPC                          Describe:  Decreased gait speed  ROM / Strength  [] Unable to assess                  AROM                      PROM                   Strength (1-5)      Left Right Left Right Left Right   Hip Flexion [facility] [place] WFL     5 4     Extension [facility] [place] WFL     NT NT     Abduction [facility] WFL     5 5     Adduction WFL WFL     NT NT   Knee Flexion 135 115   120 5 4     Extension 0 0

## 2023-10-02 ENCOUNTER — HOSPITAL ENCOUNTER (OUTPATIENT)
Facility: HOSPITAL | Age: 63
Setting detail: RECURRING SERIES
Discharge: HOME | End: 2023-10-05
Payer: MEDICAID

## 2023-10-02 PROCEDURE — 97530 THERAPEUTIC ACTIVITIES: CPT

## 2023-10-02 PROCEDURE — 97110 THERAPEUTIC EXERCISES: CPT

## 2023-10-02 PROCEDURE — 97116 GAIT TRAINING THERAPY: CPT

## 2023-10-02 NOTE — PROGRESS NOTES
PHYSICAL / OCCUPATIONAL THERAPY - DAILY TREATMENT NOTE (updated )    Patient Name: Demond Rowell    Date: 10/2/2023    : 1960  Insurance: Payor: Monique Mcgill / Plan: Clare S. Gloria / Product Type: *No Product type* /      Patient  verified yes     Visit #   Current / Total 11 18   Time   In / Out 9:19 10:15   Pain   In / Out 2 1   Subjective Functional Status/Changes: Goes back to the knee doctor next week. Pt states it's taking a long time for her strips to come off (steri-strips), but \"I'm not messing with them. \"    Pt states she has been sleeping in her daughter's bed because her bed is really high and difficult for her to get in and out, reports she used to sleep on her stomach, but has been too scared to get on her stomach at home. Next PN due 10/29/23  RC NA  Auth date 23    TREATMENT AREA =  Pain in right knee [M25.561]    OBJECTIVE    Ice (UNBILLED):  location/position: CP to right knee, Patient supine with LE wedge       Min Rationale   10 decrease edema, decrease inflammation, and decrease pain to improve patient's ability to progress to PLOF and address remaining functional goals. Skin assessment post-treatment:   Intact     Therapeutic Procedures: Tx Min Billable or 1:1 Min (if diff from Tx Min) Procedure, Rationale, Specifics   19  23609 Therapeutic Exercise (timed):  increase ROM, strength, coordination, balance, and proprioception to improve patient's ability to progress to PLOF and address remaining functional goals. (see flow sheet as applicable)     Details if applicable:       15  40537 Therapeutic Activity (timed):  use of dynamic activities replicating functional movements to increase ROM, strength, coordination, balance, and proprioception in order to improve patient's ability to progress to PLOF and address remaining functional goals. (see flow sheet as applicable)     Details if applicable:     12  59173 Gait Training (timed):  To improve safety and dynamic

## 2023-10-04 ENCOUNTER — HOSPITAL ENCOUNTER (OUTPATIENT)
Facility: HOSPITAL | Age: 63
Setting detail: RECURRING SERIES
Discharge: HOME | End: 2023-10-07
Payer: MEDICAID

## 2023-10-04 PROCEDURE — 97112 NEUROMUSCULAR REEDUCATION: CPT

## 2023-10-04 PROCEDURE — 97116 GAIT TRAINING THERAPY: CPT

## 2023-10-04 PROCEDURE — 97530 THERAPEUTIC ACTIVITIES: CPT

## 2023-10-04 PROCEDURE — 97110 THERAPEUTIC EXERCISES: CPT

## 2023-10-04 NOTE — PROGRESS NOTES
10/25/2023 10:00 AM Sydelle Frankel, PTA [facility]   10/27/2023 10:00 AM Jose L Buckner, PT [facility]   10/30/2023 10:00 AM Manisha Green, PT [facility]

## 2023-10-06 ENCOUNTER — HOSPITAL ENCOUNTER (OUTPATIENT)
Facility: HOSPITAL | Age: 63
Setting detail: RECURRING SERIES
Discharge: HOME | End: 2023-10-09
Payer: MEDICAID

## 2023-10-06 PROCEDURE — 97112 NEUROMUSCULAR REEDUCATION: CPT

## 2023-10-06 PROCEDURE — 97110 THERAPEUTIC EXERCISES: CPT

## 2023-10-06 PROCEDURE — 97116 GAIT TRAINING THERAPY: CPT

## 2023-10-06 PROCEDURE — 97530 THERAPEUTIC ACTIVITIES: CPT

## 2023-10-06 NOTE — PROGRESS NOTES
PHYSICAL / OCCUPATIONAL THERAPY - DAILY TREATMENT NOTE (updated )    Patient Name: Efrem Tao    Date: 10/6/2023    : 1960  Insurance: Payor: Odette Wendyamol / Plan:  S[address] Gloria / Product Type: *No Product type* /      Patient  verified Yes     Visit #   Current / Total 13 18   Time   In / Out 10:00 10:58   Pain   In / Out 2-3 0   Subjective Functional Status/Changes: Pt reports that she is sore behind her knee. Next PN/ RC due 10/29/2023  Auth due dean [place], 2023    TREATMENT AREA =  Pain in right knee [M25.561]    OBJECTIVE    Modalities Rationale:     decrease pain to improve patient's ability to progress to PLOF and address remaining functional goals. min [] Estim Unattended, type/location:                                      []  w/ice    []  w/heat    min [] Estim Attended, type/location:                                     []  w/US     []  w/ice    []  w/heat    []  TENS insruct      min []  Mechanical Traction: type/lbs                   []  pro   []  sup   []  int   []  cont    []  before manual    []  after manual    min []  Ultrasound, settings/location:     10 min  unbill [x]  Ice     []  Heat    location/position: Right knee, supine with wedge under B LE    min []  Paraffin,  details:     min []  Vasopneumatic Device, press/temp:     min []  Aryan Armenta / Chaz James: If using vaso (only need to measure limb vaso being performed on)      pre-treatment girth :       post-treatment girth :       measured at (landmark location) :      min []  Other:    Skin assessment post-treatment:   Intact      Therapeutic Procedures: Tx Min Billable or 1:1 Min (if diff from Tx Min) Procedure, Rationale, Specifics   14  77594 Therapeutic Exercise (timed):  increase ROM, strength, coordination, balance, and proprioception to improve patient's ability to progress to PLOF and address remaining functional goals.  (see flow sheet as applicable)     Details if applicable:       12  [id number]

## 2023-10-10 ENCOUNTER — HOSPITAL ENCOUNTER (OUTPATIENT)
Facility: HOSPITAL | Age: 63
Setting detail: RECURRING SERIES
Discharge: HOME | End: 2023-10-13
Payer: MEDICAID

## 2023-10-10 PROCEDURE — 97110 THERAPEUTIC EXERCISES: CPT

## 2023-10-10 PROCEDURE — 97112 NEUROMUSCULAR REEDUCATION: CPT

## 2023-10-10 PROCEDURE — 97530 THERAPEUTIC ACTIVITIES: CPT

## 2023-10-10 NOTE — PROGRESS NOTES
PHYSICAL / OCCUPATIONAL THERAPY - DAILY TREATMENT NOTE (updated )    Patient Name: Danay Welch    Date: 10/10/2023    : 1960  Insurance: Payor: Claudette Ferreira / Plan: Clare [address] Gloria / Product Type: *No Product type* /      Patient  verified yes     Visit #   Current / Total 14 18   Time   In / Out 10:02 10:45   Pain   In / Out 0 1   Subjective Functional Status/Changes: Pt advised doctor's appointment rescheduled for next week 10/18, she's a little frustrated because wants to get back to driving. Pt reports steri strips coming off more and she wants to be able to put lotion on her scar. Pt states she has been able to lay on her stomach at home and reports knee still getting really stiff. Next PN due 10/29/23  RC NA  Auth date 23    TREATMENT AREA =  Pain in right knee [M25.561]    OBJECTIVE    Therapeutic Procedures: Tx Min Billable or 1:1 Min (if diff from Tx Min) Procedure, Rationale, Specifics   15  00011 Therapeutic Exercise (timed):  increase ROM, strength, coordination, balance, and proprioception to improve patient's ability to progress to PLOF and address remaining functional goals. (see flow sheet as applicable)     Details if applicable:       18  31139 Therapeutic Activity (timed):  use of dynamic activities replicating functional movements to increase ROM, strength, coordination, balance, and proprioception in order to improve patient's ability to progress to PLOF and address remaining functional goals. (see flow sheet as applicable)     Details if applicable:     10  10898 Neuromuscular Re-Education (timed):  improve balance, coordination, kinesthetic sense, posture, core stability and proprioception to improve patient's ability to develop conscious control of individual muscles and awareness of position of extremities in order to progress to PLOF and address remaining functional goals.  (see flow sheet as applicable)     Details if applicable:     37  [facility] Totals

## 2023-10-11 ENCOUNTER — HOSPITAL ENCOUNTER (OUTPATIENT)
Facility: HOSPITAL | Age: 63
Setting detail: RECURRING SERIES
Discharge: HOME | End: 2023-10-14
Payer: MEDICAID

## 2023-10-11 PROCEDURE — 97530 THERAPEUTIC ACTIVITIES: CPT

## 2023-10-11 PROCEDURE — 97140 MANUAL THERAPY 1/> REGIONS: CPT

## 2023-10-11 PROCEDURE — 97110 THERAPEUTIC EXERCISES: CPT

## 2023-10-11 NOTE — PROGRESS NOTES
patient's ability to progress to PLOF and address remaining functional goals. The manual therapy interventions were performed at a separate and distinct time from the therapeutic activities interventions . (see flow sheet as applicable)     Details if applicable:  IASTM (FRAM F1) and lacrosse ball for STM to right ITBand. Educated pt on and reviewed self STM for HEP   36  [address] [facility] [address] Totals Reminder: bill using total billable min of TIMED therapeutic procedures (example: do not include dry needle or estim unattended, both untimed codes, in totals to left)  8-22 min = 1 unit; 23-37 min = 2 units; 38-52 min = 3 units; 53-67 min = 4 units; 68-82 min = 5 units   Total Total     [x]  Patient Education billed concurrently with other procedures   [x] Review HEP  Discussed adding self STM to address ITband tightness, pt agreeable and verbalized understanding    [] Progressed/Changed HEP, detail:    [] Other detail:       Objective Information/Functional Measures/Assessment    Progressed from NuStep to bike with good tolerance, pt able to demo full revolution without difficulty. Added forward and lateral natalie step overs, pt able to demo without LOB. .    Pt with TTP distal ITBand, initiated IASTM to address soft tissue restrictions. Patient will continue to benefit from skilled PT services to modify and progress therapeutic interventions, analyze and address functional mobility deficits, analyze and address ROM deficits, analyze and address strength deficits, analyze and address soft tissue restrictions, analyze and cue for proper movement patterns, and instruct in home and community integration to address functional deficits and attain remaining goals. Progress toward goals / Updated goals:  []  See Progress Note/Recertification    1. Patient will demonstrate independence with HEP. 2. Patient will demonstrate greater than or equal to 120 degrees right knee flexion AROM to facilitate ADLs/IADLs.   3. Patient will

## 2023-10-13 ENCOUNTER — HOSPITAL ENCOUNTER (OUTPATIENT)
Facility: HOSPITAL | Age: 63
Setting detail: RECURRING SERIES
Discharge: HOME | End: 2023-10-16
Payer: MEDICAID

## 2023-10-13 PROCEDURE — 97110 THERAPEUTIC EXERCISES: CPT

## 2023-10-13 PROCEDURE — 97530 THERAPEUTIC ACTIVITIES: CPT

## 2023-10-13 PROCEDURE — 97112 NEUROMUSCULAR REEDUCATION: CPT

## 2023-10-13 NOTE — PROGRESS NOTES
[x]  Patient Education billed concurrently with other procedures   [x] Review HEP    [] Progressed/Changed HEP, detail:    [] Other detail:       Objective Information/Functional Measures/Assessment    Right knee flexion AROM 117 degrees. Added leg press B LE 20lbs, min Vcing for form and pt with moderate ms fatigue. Forward and lateral step ups without UE support (4\" block) and MIP without UE support for SLS and dynamic balance. Pt without LOB or safety concerns. Patient will continue to benefit from skilled PT services to modify and progress therapeutic interventions, analyze and address functional mobility deficits, analyze and address ROM deficits, analyze and address strength deficits, analyze and address soft tissue restrictions, analyze and cue for proper movement patterns, and instruct in home and community integration to address functional deficits and attain remaining goals. Progress toward goals / Updated goals:  []  See Progress Note/Recertification    1. Patient will demonstrate independence with HEP. 2. Patient will demonstrate greater than or equal to 120 degrees right knee flexion AROM to facilitate ADLs/IADLs. 3. Patient will ambulate greater than or equal to 250' without assistive device with normalized gait pattern to facilitate community ambulation.   Pt transitioning from RW to [address]  yes Continue plan of care  []  Upgrade activities as tolerated  []  Discharge due to :  []  Other:        Sarina Franklin PTA    10/13/2023    3:32 PM    Future Appointments   Date Time Provider [address]   10/16/2023 10:00 AM Ginger Donohue PT [facility]   10/18/2023 10:00 AM Milady Pope PT [facility]   10/20/2023 10:00 AM Milady Pope PT [facility]   10/23/2023 10:00 AM Ginger Donohue PT [facility]   10/25/2023 10:00 AM Sarina Franklin PTA [facility]   10/27/2023 10:00 AM Milady Pope PT [facility]   10/30/2023 10:00 AM Oleg

## 2023-10-16 ENCOUNTER — HOSPITAL ENCOUNTER (OUTPATIENT)
Facility: HOSPITAL | Age: 63
Setting detail: RECURRING SERIES
Discharge: HOME | End: 2023-10-19
Payer: MEDICAID

## 2023-10-16 PROCEDURE — 97116 GAIT TRAINING THERAPY: CPT

## 2023-10-16 PROCEDURE — 97112 NEUROMUSCULAR REEDUCATION: CPT

## 2023-10-16 PROCEDURE — 97530 THERAPEUTIC ACTIVITIES: CPT

## 2023-10-16 PROCEDURE — 97110 THERAPEUTIC EXERCISES: CPT

## 2023-10-16 NOTE — PROGRESS NOTES
PHYSICAL THERAPY - DAILY TREATMENT NOTE (updated )    Patient Name: Gia Hatch    Date: 10/16/2023    : 1960  Insurance: Payor: Ana Pearson / Plan: Clare ANYE Mimssy / Product Type: *No Product type* /      Patient  verified yes     Visit #   Current / Total 17 18   Time   In / Out 10:02 10:41   Pain   In / Out 0/10 0/10   Subjective Functional Status/Changes: \"No pain, just soreness. \"     TREATMENT AREA =  Pain in right knee [M25.561]    Next PN due 10/29/23  RC NA  Auth date 23    OBJECTIVE    Therapeutic Procedures: Tx Min Billable or 1:1 Min (if diff from Tx Min) Procedure, Rationale, Specifics   8  96659 Therapeutic Exercise (timed):  increase ROM, strength, coordination, balance, and proprioception to improve patient's ability to progress to PLOF and address remaining functional goals. (see flow sheet as applicable)     Details if applicable:       13  42769 Therapeutic Activity (timed):  use of dynamic activities replicating functional movements to increase ROM, strength, coordination, balance, and proprioception in order to improve patient's ability to progress to PLOF and address remaining functional goals. (see flow sheet as applicable)     Details if applicable:     8  52102 Neuromuscular Re-Education (timed):  improve balance, coordination, kinesthetic sense, posture, core stability and proprioception to improve patient's ability to develop conscious control of individual muscles and awareness of position of extremities in order to progress to PLOF and address remaining functional goals. (see flow sheet as applicable)     Details if applicable:     10  83345 Gait Training (timed):   to improve safety and dynamic movement with household/community ambulation.   (see flow sheet as applicable)        Details if applicable:   44  [facility] Totals Reminder: bill using total billable min of TIMED therapeutic procedures (example: do not include dry needle or estim unattended, both untimed

## 2023-10-18 ENCOUNTER — HOSPITAL ENCOUNTER (OUTPATIENT)
Facility: HOSPITAL | Age: 63
Setting detail: RECURRING SERIES
Discharge: HOME | End: 2023-10-21
Payer: MEDICAID

## 2023-10-18 PROCEDURE — 97116 GAIT TRAINING THERAPY: CPT

## 2023-10-18 PROCEDURE — 97112 NEUROMUSCULAR REEDUCATION: CPT

## 2023-10-18 PROCEDURE — 97110 THERAPEUTIC EXERCISES: CPT

## 2023-10-18 PROCEDURE — 97530 THERAPEUTIC ACTIVITIES: CPT

## 2023-10-18 NOTE — PROGRESS NOTES
HEP. - MET  2. Patient will demonstrate greater than or equal to 120 degrees right knee flexion AROM to facilitate ADLs/IADLs. - MET  3. Patient will ambulate greater than or equal to 250' without assistive device with normalized gait pattern to facilitate community ambulation. - MET  : Non-Medicare, can change goals, can adjust or add frequency duration, no signature required      New Goals to be achieved in __2-4__ weeks  1. Patient will ambulate greater than or equal to 500' on uneven surfaces without assistive device to facilitate community ambulation. Frequency / Duration:   Patient to be seen   2-3   times per week for   2-4    weeks:    RECOMMENDATIONS  We would like to continue therapy for progress to goals stated above. Continue therapy with changes to Plan of Care to include: new goals noted above    If you have any questions/comments please contact us directly. Thank you for allowing us to assist in the care of your patient.     Raysa Hansen, PT       10/18/2023       10:11 AM
restrictions, analyze and cue for proper movement patterns, analyze and modify for postural abnormalities, analyze and address imbalance/dizziness, and instruct in home and community integration to address functional deficits and attain remaining goals.     Progress toward goals / Updated goals:  [x]  See Progress Note/Recertification    PLAN  Yes  Continue plan of care  [x]  Upgrade activities as tolerated  []  Discharge due to :  []  Other:    Claire Villalobos, PT    10/18/2023    10:01 AM    Future Appointments   Date Time Provider [address]   10/20/2023 10:00 AM Claire Villalobos, PT [facility]   10/23/2023 10:00 AM Abram Mascorro, PT [facility]   10/25/2023 10:00 AM Roger Darnell PTA [facility]   10/27/2023 10:00 AM Claire Villalobos PT [facility]   10/30/2023 10:00 AM SEBAS Dumas [facility]

## 2023-10-20 ENCOUNTER — HOSPITAL ENCOUNTER (OUTPATIENT)
Facility: HOSPITAL | Age: 63
Setting detail: RECURRING SERIES
Discharge: HOME | End: 2023-10-23
Payer: MEDICAID

## 2023-10-20 PROCEDURE — 97116 GAIT TRAINING THERAPY: CPT

## 2023-10-20 PROCEDURE — 97535 SELF CARE MNGMENT TRAINING: CPT

## 2023-10-20 PROCEDURE — 97112 NEUROMUSCULAR REEDUCATION: CPT

## 2023-10-20 PROCEDURE — 97110 THERAPEUTIC EXERCISES: CPT

## 2023-10-20 PROCEDURE — 97530 THERAPEUTIC ACTIVITIES: CPT

## 2023-10-20 NOTE — PROGRESS NOTES
PHYSICAL / OCCUPATIONAL THERAPY - DAILY TREATMENT NOTE (updated )    Patient Name: Leann Veloz    Date: 10/20/2023    : 1960  Insurance: Payor: Marcy Ellington / Plan: [address] / Product Type: *No Product type* /      Patient  verified Yes     Visit #   Current / Total 19 22-30   Time   In / Out 9:44 10:48   Pain   In / Out 0 0   Subjective Functional Status/Changes: Pt reports that she saw Dr. Dorothy Page yesterday and he was pleased. Next PN/ RC due 2023  Auth due JONATHAN, med Sherle [place], 2023    TREATMENT AREA =  Pain in right knee [M25.561]    OBJECTIVE    Modalities Rationale:     decrease pain to improve patient's ability to progress to PLOF and address remaining functional goals. min [] Estim Unattended, type/location:                                      []  w/ice    []  w/heat    min [] Estim Attended, type/location:                                     []  w/US     []  w/ice    []  w/heat    []  TENS insruct      min []  Mechanical Traction: type/lbs                   []  pro   []  sup   []  int   []  cont    []  before manual    []  after manual    min []  Ultrasound, settings/location:     10 min  unbill [x]  Ice     []  Heat    location/position: Right knee, supine with wedge under right LE    min []  Paraffin,  details:     min []  Vasopneumatic Device, press/temp:     min []  Charlann Dues / Exie Nguyen: If using vaso (only need to measure limb vaso being performed on)      pre-treatment girth :       post-treatment girth :       measured at (landmark location) :      min []  Other:    Skin assessment post-treatment:   Intact      Therapeutic Procedures: Tx Min Billable or 1:1 Min (if diff from Tx Min) Procedure, Rationale, Specifics   12  96032 Therapeutic Exercise (timed):  increase ROM, strength, coordination, balance, and proprioception to improve patient's ability to progress to PLOF and address remaining functional goals.  (see flow sheet as applicable)     Details if

## 2023-10-23 ENCOUNTER — HOSPITAL ENCOUNTER (OUTPATIENT)
Facility: HOSPITAL | Age: 63
Setting detail: RECURRING SERIES
Discharge: HOME | End: 2023-10-26
Payer: MEDICAID

## 2023-10-23 PROCEDURE — 97112 NEUROMUSCULAR REEDUCATION: CPT

## 2023-10-23 PROCEDURE — 97116 GAIT TRAINING THERAPY: CPT

## 2023-10-23 PROCEDURE — 97530 THERAPEUTIC ACTIVITIES: CPT

## 2023-10-23 PROCEDURE — 97110 THERAPEUTIC EXERCISES: CPT

## 2023-10-23 NOTE — PROGRESS NOTES
PHYSICAL THERAPY - DAILY TREATMENT NOTE (updated )    Patient Name: Freddy Rosenberg    Date: 10/23/2023    : 1960  Insurance: Payor: Cory Alan / Plan: Clare [address] Gloria / Product Type: *No Product type* /      Patient  verified yes     Visit #   Current / Total 20 22-30   Time   In / Out 10:03 10:55   Pain   In / Out 0/10 2-3/10   Subjective Functional Status/Changes: \"It's just sore. I don't really have any pain. \"     TREATMENT AREA =  Pain in right knee [M25.561]    Next PN/ RC due 2023  Auth due JONATHAN, med nec, 2023    OBJECTIVE    Ice (UNBILLED):  location/position: CP to right knee in supported H/L     Min Rationale   10 decrease edema, decrease inflammation, and decrease pain to improve patient's ability to progress to PLOF and address remaining functional goals. Skin assessment post-treatment:   Intact     Therapeutic Procedures: Tx Min Billable or 1:1 Min (if diff from Tx Min) Procedure, Rationale, Specifics   12  18322 Therapeutic Exercise (timed):  increase ROM, strength, coordination, balance, and proprioception to improve patient's ability to progress to PLOF and address remaining functional goals. (see flow sheet as applicable)     Details if applicable:       12  16665 Therapeutic Activity (timed):  use of dynamic activities replicating functional movements to increase ROM, strength, coordination, balance, and proprioception in order to improve patient's ability to progress to PLOF and address remaining functional goals. (see flow sheet as applicable)     Details if applicable:     10  43087 Neuromuscular Re-Education (timed):  improve balance, coordination, kinesthetic sense, posture, core stability and proprioception to improve patient's ability to develop conscious control of individual muscles and awareness of position of extremities in order to progress to PLOF and address remaining functional goals.  (see flow sheet as applicable)     Details if applicable:     8

## 2023-10-25 ENCOUNTER — HOSPITAL ENCOUNTER (OUTPATIENT)
Facility: HOSPITAL | Age: 63
Setting detail: RECURRING SERIES
Discharge: HOME | End: 2023-10-28
Payer: MEDICAID

## 2023-10-25 PROCEDURE — 97116 GAIT TRAINING THERAPY: CPT

## 2023-10-25 PROCEDURE — 97530 THERAPEUTIC ACTIVITIES: CPT

## 2023-10-25 PROCEDURE — 97140 MANUAL THERAPY 1/> REGIONS: CPT

## 2023-10-25 PROCEDURE — 97110 THERAPEUTIC EXERCISES: CPT

## 2023-10-25 NOTE — PROGRESS NOTES
PHYSICAL / OCCUPATIONAL THERAPY - DAILY TREATMENT NOTE (updated )    Patient Name: Ron Kemp    Date: 10/25/2023    : 1960  Insurance: Payor: rBad Benjamin / Plan: Clare S[address] Gloria / Product Type: *No Product type* /      Patient  verified yes     Visit #   Current / Total 21 22-30   Time   In / Out 9:56 10:52   Pain   In / Out 0 1   Subjective Functional Status/Changes: Pt states she called her doctor's office yesterday and left a message for the doctor to call back about the \"band of tightness\" around her knee. Pt reports she did a lot of shopping with her friend recently and her knee got really sore and swollen because \"we did too much. \"    Pt states getting in and out of the car is difficult still. Pt reports doctor told her to walk with and without the cane so she that's what she has been doing. Next PN due 23  RC JONATHAN  Auth date JONATHAN    TREATMENT AREA =  Pain in right knee [M25.561]    OBJECTIVE    Vasopneumatic Device (UNTIMED), press/temp: medium pressure/coldest temp    Involved limb - Pre-Treatment Girth: 45 cm  Post-Treatment Girth: 44.6 cm  Index: superior patella      Min Rationale   10 decrease edema, decrease inflammation, and decrease pain to improve patient's ability to progress to PLOF and address remaining functional goals. Skin assessment post-treatment:   Intact     Therapeutic Procedures: Tx Min Billable or 1:1 Min (if diff from Tx Min) Procedure, Rationale, Specifics   13  04527 Therapeutic Exercise (timed):  increase ROM, strength, coordination, balance, and proprioception to improve patient's ability to progress to PLOF and address remaining functional goals.  (see flow sheet as applicable)     Details if applicable:       15  18227 Therapeutic Activity (timed):  use of dynamic activities replicating functional movements to increase ROM, strength, coordination, balance, and proprioception in order to improve patient's ability to progress to PLOF and

## 2023-10-27 ENCOUNTER — HOSPITAL ENCOUNTER (OUTPATIENT)
Facility: HOSPITAL | Age: 63
Setting detail: RECURRING SERIES
Discharge: HOME | End: 2023-10-30
Payer: MEDICAID

## 2023-10-27 PROCEDURE — 97112 NEUROMUSCULAR REEDUCATION: CPT

## 2023-10-27 PROCEDURE — 97016 VASOPNEUMATIC DEVICE THERAPY: CPT

## 2023-10-27 PROCEDURE — 97110 THERAPEUTIC EXERCISES: CPT

## 2023-10-27 PROCEDURE — 97530 THERAPEUTIC ACTIVITIES: CPT

## 2023-10-27 NOTE — PROGRESS NOTES
PHYSICAL / OCCUPATIONAL THERAPY - DAILY TREATMENT NOTE (updated )    Patient Name: Demond Rowell    Date: 10/27/2023    : 1960  Insurance: Payor: Monique Mcgill / Plan: Clare NAYE Castro / Product Type: *No Product type* /      Patient  verified Yes     Visit #   Current / Total 22 22-30   Time   In / Out 9:54 10:40   Pain   In / Out 0 0   Subjective Functional Status/Changes: Pt without complaints. Next PN/ RC due 2023  Auth due JONATHAN, dean Xiong, 2023    TREATMENT AREA =  Pain in right knee [M25.561]    OBJECTIVE    Modalities Rationale:     decrease edema and decrease pain to improve patient's ability to progress to PLOF and address remaining functional goals. min [] Estim Unattended, type/location:                                      []  w/ice    []  w/heat    min [] Estim Attended, type/location:                                     []  w/US     []  w/ice    []  w/heat    []  TENS insruct      min []  Mechanical Traction: type/lbs                   []  pro   []  sup   []  int   []  cont    []  before manual    []  after manual    min []  Ultrasound, settings/location:      min  unbill []  Ice     []  Heat    location/position:     min []  Paraffin,  details:    10 min [x]  Vasopneumatic Device, press/temp: Low, 34 deg, right knee, supine with wedge under B LE    min []  Wilma Rousseau / Connie Lasso: If using vaso (only need to measure limb vaso being performed on)      pre-treatment girth : 46.5 cm      post-treatment girth : 45.0 cm      measured at (landmark location) : mid-patella     min []  Other:    Skin assessment post-treatment:   Intact      Therapeutic Procedures: Tx Min Billable or 1:1 Min (if diff from Tx Min) Procedure, Rationale, Specifics   14  22744 Therapeutic Exercise (timed):  increase ROM, strength, coordination, balance, and proprioception to improve patient's ability to progress to PLOF and address remaining functional goals.  (see flow sheet as applicable)     Details

## 2023-10-30 ENCOUNTER — HOSPITAL ENCOUNTER (OUTPATIENT)
Facility: HOSPITAL | Age: 63
Setting detail: RECURRING SERIES
Discharge: HOME | End: 2023-11-02
Payer: MEDICAID

## 2023-10-30 PROCEDURE — 97530 THERAPEUTIC ACTIVITIES: CPT

## 2023-10-30 PROCEDURE — 97112 NEUROMUSCULAR REEDUCATION: CPT

## 2023-10-30 PROCEDURE — 97110 THERAPEUTIC EXERCISES: CPT

## 2023-10-30 PROCEDURE — 97016 VASOPNEUMATIC DEVICE THERAPY: CPT

## 2023-10-30 PROCEDURE — 97116 GAIT TRAINING THERAPY: CPT

## 2023-10-30 NOTE — PROGRESS NOTES
PHYSICAL THERAPY - DAILY TREATMENT NOTE (updated )    Patient Name: Nasreen Lot    Date: 10/30/2023    : 1960  Insurance: Payor: Lucioramonemichele Shanti / Plan: Clare [address]Am[address] / Product Type: *No Product type* /      Patient  verified yes     Visit #   Current / Total 23 22-30   Time   In / Out 10:00 10:52   Pain   In / Out 0/10 0/10   Subjective Functional Status/Changes: \"It is swollen today. \"     TREATMENT AREA =  Pain in right knee [M25.561]    Next PN/ RC due 2023  Auth due JONATHAN, med nec, 2023    OBJECTIVE    Vasopneumatic Device (UNTIMED), press/temp: medium pressure/coldest temp    Involved limb - Pre-Treatment Girth: 45.0 cm  Post-Treatment Girth: 44.5 cm  [place]: superior patella      Min Rationale   10 decrease edema, decrease inflammation, and decrease pain to improve patient's ability to progress to PLOF and address remaining functional goals. Skin assessment post-treatment:   Intact     Therapeutic Procedures: Tx Min Billable or 1:1 Min (if diff from Tx Min) Procedure, Rationale, Specifics   14  61391 Therapeutic Exercise (timed):  increase ROM, strength, coordination, balance, and proprioception to improve patient's ability to progress to PLOF and address remaining functional goals. (see flow sheet as applicable)     Details if applicable:       10  31095 Therapeutic Activity (timed):  use of dynamic activities replicating functional movements to increase ROM, strength, coordination, balance, and proprioception in order to improve patient's ability to progress to PLOF and address remaining functional goals.   (see flow sheet as applicable)     Details if applicable:     10  09613 Neuromuscular Re-Education (timed):  improve balance, coordination, kinesthetic sense, posture, core stability and proprioception to improve patient's ability to develop conscious control of individual muscles and awareness of position of extremities in order to progress to PLOF and address remaining

## 2023-11-01 ENCOUNTER — HOSPITAL ENCOUNTER (OUTPATIENT)
Facility: HOSPITAL | Age: 63
Setting detail: RECURRING SERIES
Discharge: HOME | End: 2023-11-04
Payer: MEDICAID

## 2023-11-01 PROCEDURE — 97110 THERAPEUTIC EXERCISES: CPT

## 2023-11-01 PROCEDURE — 97112 NEUROMUSCULAR REEDUCATION: CPT

## 2023-11-01 PROCEDURE — 97530 THERAPEUTIC ACTIVITIES: CPT

## 2023-11-01 NOTE — PROGRESS NOTES
activities as tolerated  []  Discharge due to :  []  Other:        Sydelle Frankel, PTA    11/1/2023    10:02 AM    Future Appointments   Date Time Provider [address]   11/3/2023 10:00 AM Agapito Joy [facility]   11/6/2023 10:00 AM Jhonatan Giron, PT [facility]   11/8/2023 10:00 AM Manisha Green, PT [facility]   11/10/2023 10:00 AM Sydelle Frankel, PTA [facility]   11/13/2023 10:00 AM Tea Navarro, PTA [facility]   11/15/2023 10:00 AM Tea Navarro, PTA [facility]   11/17/2023 10:00 AM Tea Navarro, PTA [facility]

## 2023-11-03 ENCOUNTER — HOSPITAL ENCOUNTER (OUTPATIENT)
Facility: HOSPITAL | Age: 63
Setting detail: RECURRING SERIES
Discharge: HOME | End: 2023-11-06
Payer: MEDICAID

## 2023-11-03 PROCEDURE — 97016 VASOPNEUMATIC DEVICE THERAPY: CPT

## 2023-11-03 PROCEDURE — 97140 MANUAL THERAPY 1/> REGIONS: CPT

## 2023-11-03 PROCEDURE — 97110 THERAPEUTIC EXERCISES: CPT

## 2023-11-03 PROCEDURE — 97530 THERAPEUTIC ACTIVITIES: CPT

## 2023-11-03 NOTE — PROGRESS NOTES
PHYSICAL / OCCUPATIONAL THERAPY - DAILY TREATMENT NOTE (updated )    Patient Name: Tiara Saleh    Date: 11/3/2023    : 1960  Insurance: Payor: Bakari Ramon / Plan: Clare SAmos Castro / Product Type: *No Product type* /      Patient  verified yes     Visit #   Current / Total 25 22-30   Time   In / Out 10:00 11:10   Pain   In / Out 1-2 1   Subjective Functional Status/Changes: Pt wants to know if she is limping when she is walking. Pt states she is still having the tightness in the side/back of right knee. Next PN/ RC due 2023  Auth due JONATHAN, dean Randall, 2023    TREATMENT AREA =  Pain in right knee [M25.561]    OBJECTIVE    Vasopneumatic Device (UNTIMED), press/temp: medium pressure/coldest settign     Involved limb - Pre-Treatment Girth: 68.7  Post-Treatment Girth: 46.3  [place]: mid patella    Min Rationale   15 decrease edema, decrease inflammation, and decrease pain to improve patient's ability to progress to PLOF and address remaining functional goals. Skin assessment post-treatment:   Intact     Therapeutic Procedures: Tx Min Billable or 1:1 Min (if diff from Tx Min) Procedure, Rationale, Specifics   25  07315 Therapeutic Exercise (timed):  increase ROM, strength, coordination, balance, and proprioception to improve patient's ability to progress to PLOF and address remaining functional goals. (see flow sheet as applicable)     Details if applicable:       22  86500 Therapeutic Activity (timed):  use of dynamic activities replicating functional movements to increase ROM, strength, coordination, balance, and proprioception in order to improve patient's ability to progress to PLOF and address remaining functional goals.   (see flow sheet as applicable)     Details if applicable:  RDL, gait with and without SPC    8  27444 Manual Therapy (timed):  decrease pain, increase ROM, and increase tissue extensibility to improve patient's ability to progress to PLOF and address remaining
English

## 2023-11-06 ENCOUNTER — HOSPITAL ENCOUNTER (OUTPATIENT)
Facility: HOSPITAL | Age: 63
Setting detail: RECURRING SERIES
Discharge: HOME | End: 2023-11-09
Payer: MEDICAID

## 2023-11-06 PROCEDURE — 97116 GAIT TRAINING THERAPY: CPT

## 2023-11-06 PROCEDURE — 97530 THERAPEUTIC ACTIVITIES: CPT

## 2023-11-06 PROCEDURE — 97016 VASOPNEUMATIC DEVICE THERAPY: CPT

## 2023-11-06 PROCEDURE — 97110 THERAPEUTIC EXERCISES: CPT

## 2023-11-06 PROCEDURE — 97112 NEUROMUSCULAR REEDUCATION: CPT

## 2023-11-06 NOTE — PROGRESS NOTES
PHYSICAL / OCCUPATIONAL THERAPY - DAILY TREATMENT NOTE (updated )    Patient Name: Nettie Barnett    Date: 2023    : 1960  Insurance: Payor: Uriel Jenniferelaine / Plan: [address] / Product Type: *No Product type* /      Patient  verified YES    Visit #   Current / Total 26 30   Time   In / Out 1000 1104   Pain   In / Out 2 0   Subjective Functional Status/Changes: Mulu Montemayor denies ans adverse effects following last session. TREATMENT AREA =  Pain in right knee [M25.561]    OBJECTIVE    Vasopneumatic Device (UNTIMED), press/temp: medium pressure/coldest settign     Involved limb - Pre-Treatment Girth: 47  Post-Treatment Girth: 46 [place]: mid patella    Min Rationale   10 decrease edema, decrease inflammation, and decrease pain to improve patient's ability to progress to PLOF and address remaining functional goals. Skin assessment post-treatment:   Intact     Therapeutic Procedures: Tx Min Billable or 1:1 Min (if diff from Tx Min) Procedure, Rationale, Specifics   24  06158 Therapeutic Exercise (timed):  increase ROM, strength, coordination, balance, and proprioception to improve patient's ability to progress to PLOF and address remaining functional goals. (see flow sheet as applicable)     Details if applicable:    See flowsheet     14  00214 Therapeutic Activity (timed):  use of dynamic activities replicating functional movements to increase ROM, strength, coordination, balance, and proprioception in order to improve patient's ability to progress to PLOF and address remaining functional goals.   (see flow sheet as applicable)     Details if applicable:    See flow sheet     8  50297 Neuromuscular Re-Education (timed):  improve balance, coordination, kinesthetic sense, posture, core stability and proprioception to improve patient's ability to develop conscious control of individual muscles and awareness of position of extremities in order to progress to PLOF and address remaining functional

## 2023-11-08 ENCOUNTER — HOSPITAL ENCOUNTER (OUTPATIENT)
Facility: HOSPITAL | Age: 63
Setting detail: RECURRING SERIES
Discharge: HOME | End: 2023-11-11
Payer: MEDICAID

## 2023-11-08 PROCEDURE — 97530 THERAPEUTIC ACTIVITIES: CPT

## 2023-11-08 PROCEDURE — 97112 NEUROMUSCULAR REEDUCATION: CPT

## 2023-11-08 PROCEDURE — 97016 VASOPNEUMATIC DEVICE THERAPY: CPT

## 2023-11-08 PROCEDURE — 97110 THERAPEUTIC EXERCISES: CPT

## 2023-11-10 ENCOUNTER — HOSPITAL ENCOUNTER (OUTPATIENT)
Facility: HOSPITAL | Age: 63
Setting detail: RECURRING SERIES
Discharge: HOME | End: 2023-11-13
Payer: MEDICAID

## 2023-11-10 PROCEDURE — 97140 MANUAL THERAPY 1/> REGIONS: CPT

## 2023-11-10 PROCEDURE — 97110 THERAPEUTIC EXERCISES: CPT

## 2023-11-10 PROCEDURE — 97112 NEUROMUSCULAR REEDUCATION: CPT

## 2023-11-10 NOTE — PROGRESS NOTES
PHYSICAL / OCCUPATIONAL THERAPY - DAILY TREATMENT NOTE (updated )    Patient Name: Bishop Jarquin    Date: 11/10/2023    : 1960  Insurance: Payor: Maricel Carpenter / Plan: [address] / Product Type: *No Product type* /      Patient  verified yes     Visit #   Current / Total 28 30   Time   In / Out 10:00 10:42   Pain   In / Out 0 0   Subjective Functional Status/Changes: Pt c/o tightness right knee. Pt c/o a band of stiffness across her knee and tenderness outside of the knee. Next PN/ RC due 2023  Auth due JONATHAN, med nec, 2023    TREATMENT AREA =  Pain in right knee [M25.561]    OBJECTIVE    Therapeutic Procedures: Tx Min Billable or 1:1 Min (if diff from Tx Min) Procedure, Rationale, Specifics   15  70474 Therapeutic Exercise (timed):  increase ROM, strength, coordination, balance, and proprioception to improve patient's ability to progress to PLOF and address remaining functional goals. (see flow sheet as applicable)     Details if applicable:         40557 Neuromuscular Re-Education (timed):  improve balance, coordination, kinesthetic sense, posture, core stability and proprioception to improve patient's ability to develop conscious control of individual muscles and awareness of position of extremities in order to progress to PLOF and address remaining functional goals. (see flow sheet as applicable)     Details if applicable:  SLS stability on airex pad   15  37840 Manual Therapy (timed):  decrease pain, increase ROM, and increase tissue extensibility to improve patient's ability to progress to PLOF and address remaining functional goals. The manual therapy interventions were performed at a separate and distinct time from the therapeutic activities interventions .  (see flow sheet as applicable)     Details if applicable:  right knee: dynamic and static cupping to distal ITBand, Kinesio tape: IT strip for support at lateral joint line (75%) I strip down ITBand to decrease pull

## 2023-11-13 ENCOUNTER — HOSPITAL ENCOUNTER (OUTPATIENT)
Facility: HOSPITAL | Age: 63
Setting detail: RECURRING SERIES
Discharge: HOME | End: 2023-11-16
Payer: MEDICAID

## 2023-11-13 PROCEDURE — 97110 THERAPEUTIC EXERCISES: CPT

## 2023-11-13 PROCEDURE — 97140 MANUAL THERAPY 1/> REGIONS: CPT

## 2023-11-13 PROCEDURE — 97112 NEUROMUSCULAR REEDUCATION: CPT

## 2023-11-13 NOTE — PROGRESS NOTES
PHYSICAL / OCCUPATIONAL THERAPY - DAILY TREATMENT NOTE (updated )    Patient Name: Katya Connor    Date: 2023    : 1960  Insurance: Payor: Petty Fernandes / Plan: [address] / Product Type: *No Product type* /      Patient  verified yes     Visit #   Current / Total 29 30   Time   In / Out 10:00 10:45   Pain   In / Out 0 0   Subjective Functional Status/Changes: Pt states she feels the pain in the back/side of her knee the most when she is getting in and out of her car, really feels like getting a bigger vehicle would be better since she is tall and has long legs. Pt reports can go out and walk and shop, but knee swells up when she does too much. Pt states she goes back to work soon and will be on her feet a lot, starts early in the morning and typically was a really fast /quick worker. Thinks she will be okay doing her job duties, just not as fast as before. Next PN/ RC due 2023  Auth due JONATHAN, med nec, 2023    TREATMENT AREA =  Pain in right knee [M25.561]    OBJECTIVE    Therapeutic Procedures: Tx Min Billable or 1:1 Min (if diff from Tx Min) Procedure, Rationale, Specifics   20  68058 Therapeutic Exercise (timed):  increase ROM, strength, coordination, balance, and proprioception to improve patient's ability to progress to PLOF and address remaining functional goals. (see flow sheet as applicable)     Details if applicable:       15  33542 Neuromuscular Re-Education (timed):  improve balance, coordination, kinesthetic sense, posture, core stability and proprioception to improve patient's ability to develop conscious control of individual muscles and awareness of position of extremities in order to progress to PLOF and address remaining functional goals.  (see flow sheet as applicable)     Details if applicable:     10  77816 Manual Therapy (timed):  decrease pain, increase ROM, increase tissue extensibility, and decrease trigger points to improve patient's

## 2023-11-15 ENCOUNTER — HOSPITAL ENCOUNTER (OUTPATIENT)
Facility: HOSPITAL | Age: 63
Setting detail: RECURRING SERIES
Discharge: HOME | End: 2023-11-18
Payer: MEDICAID

## 2023-11-15 PROCEDURE — 97530 THERAPEUTIC ACTIVITIES: CPT

## 2023-11-15 PROCEDURE — 97110 THERAPEUTIC EXERCISES: CPT

## 2023-11-15 PROCEDURE — 97112 NEUROMUSCULAR REEDUCATION: CPT

## 2023-11-15 NOTE — PROGRESS NOTES
PHYSICAL / OCCUPATIONAL THERAPY - DAILY TREATMENT NOTE (updated )    Patient Name: Claritza Alan    Date: 11/15/2023    : 1960  Insurance: Payor: Mitch Isabel / Plan: [address] / Product Type: *No Product type* /      Patient  verified Yes     Visit #   Current / Total 30 30   Time   In / Out 10:02 10:39   Pain   In / Out 0 0   Subjective Functional Status/Changes: Pt reports that she continues to have soreness right posterior knee and swelling/tightness right knee. Pt states that she thinks that she will be okay to return to work. Pt reports that she does some standing at work, reports that she does have to lift boxes that weigh 20-40#. Next PN/ RC due 12/15/2023  Auth due JONATHAN, med nec, 2023    TREATMENT AREA =  Pain in right knee [M25.561]    OBJECTIVE    Therapeutic Procedures: Tx Min Billable or 1:1 Min (if diff from Tx Min) Procedure, Rationale, Specifics   18  51638 Therapeutic Exercise (timed):  increase ROM, strength, coordination, balance, and proprioception to improve patient's ability to progress to PLOF and address remaining functional goals. (see flow sheet as applicable)     Details if applicable:  Exercises per flow sheet, ROM/MMT     8  49263 Neuromuscular Re-Education (timed):  improve balance, coordination, kinesthetic sense, posture, core stability and proprioception to improve patient's ability to develop conscious control of individual muscles and awareness of position of extremities in order to progress to PLOF and address remaining functional goals. (see flow sheet as applicable)     Details if applicable:  Exercises per flow sheet   11  44647 Therapeutic Activity (timed):  use of dynamic activities replicating functional movements to increase ROM, strength, coordination, balance, and proprioception in order to improve patient's ability to progress to PLOF and address remaining functional goals.   (see flow sheet as applicable)     Details if applicable:
[x] WNL   [] Min   [] Mod   [] Severe                       (R) Tightness= [] WNL   [x] Min   [] Mod   [] Severe  Gastroc:                 (L) Tightness= [x] WNL   [] Min   [] Mod   [] Severe                       (R) Tightness= [x] WNL   [] Min   [] Mod   [] Severe    Current Goals:  1. Patient will demonstrate independence with HEP. - MET  2. Patient will demonstrate greater than or equal to 120 degrees right knee flexion AROM to facilitate ADLs/IADLs. - MET  3. Patient will ambulate greater than or equal to 250' without assistive device with normalized gait pattern to facilitate community ambulation. - MET    : Non-Medicare, can change goals, can adjust or add frequency duration, no signature required      New Goals to be achieved in __2-4__ weeks  1. Patient will  demonstrate ability to perform 10 reps SLR right LE without quad lag to  facilitate normal quad function with ADLs/IADLs, functional mobility and gait. 2. Patient will demonstrate ability to lift 40# with proper form to facilitate return to normal work duties. Frequency / Duration:   Patient to be seen   2-3   times per week for   2-4    weeks:    RECOMMENDATIONS  We would like to continue therapy for progress to goals stated above. Continue therapy with changes to Plan of Care to include: updated goals noted above    If you have any questions/comments please contact us directly. Thank you for allowing us to assist in the care of your patient.     Peterson Preston, PT       11/15/2023       12:48 PM

## 2023-11-17 ENCOUNTER — HOSPITAL ENCOUNTER (OUTPATIENT)
Facility: HOSPITAL | Age: 63
Setting detail: RECURRING SERIES
Discharge: HOME | End: 2023-11-20
Payer: MEDICAID

## 2023-11-17 PROCEDURE — 97530 THERAPEUTIC ACTIVITIES: CPT

## 2023-11-17 PROCEDURE — 97112 NEUROMUSCULAR REEDUCATION: CPT

## 2023-11-17 PROCEDURE — 97110 THERAPEUTIC EXERCISES: CPT

## 2023-11-17 PROCEDURE — 97016 VASOPNEUMATIC DEVICE THERAPY: CPT

## 2023-11-17 NOTE — PROGRESS NOTES
PHYSICAL / OCCUPATIONAL THERAPY - DAILY TREATMENT NOTE (updated )    Patient Name: Letty López    Date: 2023    : 1960  Insurance: Payor: [employer] / Plan: [address] / Product Type: *No Product type* /      Patient  verified yes     Visit #   Current / Total 31 34-38   Time   In / Out 10:01 10:50   Pain   In / Out 0 0   Subjective Functional Status/Changes: Pt reports the back and side of her right knee are still sore. 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 Next PN due 12/15/23  RC NA  Auth date JONATHAN    TREATMENT AREA =  Pain in right knee [M25.561]    OBJECTIVE    Vasopneumatic Device (UNTIMED), press/temp: medium/coldest setting    Involved limb - Pre-Treatment Girth: 43.5 cm  Post-Treatment Girth: 43 cm  [place]: mid patella      Min Rationale   10 decrease edema and decrease inflammation to improve patient's ability to progress to PLOF and address remaining functional goals. Skin assessment post-treatment:   Intact     Therapeutic Procedures: Tx Min Billable or 1:1 Min (if diff from Tx Min) Procedure, Rationale, Specifics   14  44548 Therapeutic Exercise (timed):  increase ROM, strength, coordination, balance, and proprioception to improve patient's ability to progress to PLOF and address remaining functional goals. (see flow sheet as applicable)     Details if applicable:       15  85360 Therapeutic Activity (timed):  use of dynamic activities replicating functional movements to increase ROM, strength, coordination, balance, and proprioception in order to improve patient's ability to progress to PLOF and address remaining functional goals.   (see flow sheet as applicable)     Details if applicable:     10  99120 Neuromuscular Re-Education (timed):  improve balance, coordination, kinesthetic sense, posture, core stability and proprioception to improve patient's ability to develop conscious control of individual muscles and

## 2023-11-20 ENCOUNTER — HOSPITAL ENCOUNTER (OUTPATIENT)
Facility: HOSPITAL | Age: 63
Setting detail: RECURRING SERIES
Discharge: HOME | End: 2023-11-23
Payer: MEDICAID

## 2023-11-20 PROCEDURE — 97530 THERAPEUTIC ACTIVITIES: CPT

## 2023-11-20 PROCEDURE — 97110 THERAPEUTIC EXERCISES: CPT

## 2023-11-20 PROCEDURE — 97112 NEUROMUSCULAR REEDUCATION: CPT

## 2023-11-20 PROCEDURE — 97016 VASOPNEUMATIC DEVICE THERAPY: CPT

## 2023-11-20 NOTE — PROGRESS NOTES
PHYSICAL THERAPY - DAILY TREATMENT NOTE (updated )    Patient Name: Cari Cunha    Date: 2023    : 1960  Insurance: Payor: Ashley Bowen / Plan: Clare Castro / Product Type: *No Product type* /      Patient  verified yes     Visit #   Current / Total 32 34-38   Time   In / Out 9:20 10:13   Pain   In / Out 0/10 0/10   Subjective Functional Status/Changes: \"My right leg is getting stronger but it is still weak. \"     TREATMENT AREA =  Pain in right knee [M25.561]    Next PN due 12/15/23  RC NA  Auth date JONATHAN    OBJECTIVE    Vasopneumatic Device (UNTIMED), press/temp: medium/coldest setting    Involved limb - Pre-Treatment Girth: 44.0 cm  Post-Treatment Girth: 43.0 cm  [place]: mid patella      Min Rationale   10 decrease edema and decrease inflammation to improve patient's ability to progress to PLOF and address remaining functional goals. Skin assessment post-treatment:   Intact     Therapeutic Procedures: Tx Min Billable or 1:1 Min (if diff from Tx Min) Procedure, Rationale, Specifics   15  57555 Therapeutic Exercise (timed):  increase ROM, strength, coordination, balance, and proprioception to improve patient's ability to progress to PLOF and address remaining functional goals. (see flow sheet as applicable)     Details if applicable:       20  00702 Therapeutic Activity (timed):  use of dynamic activities replicating functional movements to increase ROM, strength, coordination, balance, and proprioception in order to improve patient's ability to progress to PLOF and address remaining functional goals.   (see flow sheet as applicable)     Details if applicable:     8  20827 Neuromuscular Re-Education (timed):  improve balance, coordination, kinesthetic sense, posture, core stability and proprioception to improve patient's ability to develop conscious control of individual muscles and awareness of position of extremities in order to progress to PLOF and address remaining functional

## 2023-11-21 ENCOUNTER — HOSPITAL ENCOUNTER (OUTPATIENT)
Facility: HOSPITAL | Age: 63
Setting detail: RECURRING SERIES
Discharge: HOME | End: 2023-11-24
Payer: MEDICAID

## 2023-11-21 PROCEDURE — 97112 NEUROMUSCULAR REEDUCATION: CPT

## 2023-11-21 PROCEDURE — 97110 THERAPEUTIC EXERCISES: CPT

## 2023-11-21 PROCEDURE — 97016 VASOPNEUMATIC DEVICE THERAPY: CPT

## 2023-11-21 PROCEDURE — 97530 THERAPEUTIC ACTIVITIES: CPT

## 2023-11-21 NOTE — PROGRESS NOTES
PHYSICAL / OCCUPATIONAL THERAPY - DAILY TREATMENT NOTE (updated )    Patient Name: Freddy Rosenberg    Date: 2023    : 1960  Insurance: Payor: Cory Alan / Plan: Clare Castro / Product Type: *No Product type* /      Patient  verified YES    Visit #   Current / Total 33 34-38   Time   In / Out 922 1019   Pain   In / Out 0 0   Subjective Functional Status/Changes: Pt reports continuing to feel weak in her right lower extremity. Denies adverse effects following last session. Confirms soreness. Starts work next week. Pt may want to schedule in to December with adjusted schedule (3x > 2x/week)     TREATMENT AREA =  Pain in right knee [M25.561]    OBJECTIVE    Vasopneumatic Device (UNTIMED), press/temp: medium/coldest setting    Involved limb - Pre-Treatment Girth: 44.0 cm  Post-Treatment Girth: 43.0 cm  [place]: mid patella      Min Rationale   10 decrease edema and decrease inflammation to improve patient's ability to progress to PLOF and address remaining functional goals. Skin assessment post-treatment:   Intact       Therapeutic Procedures: Tx Min Billable or 1:1 Min (if diff from Tx Min) Procedure, Rationale, Specifics   27  38625 Therapeutic Exercise (timed):  increase ROM, strength, coordination, balance, and proprioception to improve patient's ability to progress to PLOF and address remaining functional goals. (see flow sheet as applicable)     Details if applicable:    See flowsheet     12  87023 Therapeutic Activity (timed):  use of dynamic activities replicating functional movements to increase ROM, strength, coordination, balance, and proprioception in order to improve patient's ability to progress to PLOF and address remaining functional goals.   (see flow sheet as applicable)     Details if applicable:    See flow sheet     8  27150 Neuromuscular Re-Education (timed):  improve balance, coordination, kinesthetic sense, posture, core stability and proprioception to improve

## 2023-11-22 ENCOUNTER — APPOINTMENT (OUTPATIENT)
Facility: HOSPITAL | Age: 63
End: 2023-11-22
Payer: MEDICAID

## 2023-11-24 ENCOUNTER — HOSPITAL ENCOUNTER (OUTPATIENT)
Facility: HOSPITAL | Age: 63
Setting detail: RECURRING SERIES
Discharge: HOME | End: 2023-11-27
Payer: MEDICAID

## 2023-11-24 PROCEDURE — 97112 NEUROMUSCULAR REEDUCATION: CPT

## 2023-11-24 PROCEDURE — 97530 THERAPEUTIC ACTIVITIES: CPT

## 2023-11-24 PROCEDURE — 97110 THERAPEUTIC EXERCISES: CPT

## 2023-11-24 PROCEDURE — 97016 VASOPNEUMATIC DEVICE THERAPY: CPT

## 2023-11-24 NOTE — PROGRESS NOTES
PHYSICAL THERAPY - DAILY TREATMENT NOTE (updated )    Patient Name: Clem Fail    Date: 2023    : 1960  Insurance: Payor: Reina White / Plan: [address] / Product Type: *No Product type* /      Patient  verified yes     Visit #   Current / Total 34 34-38   Time   In / Out 9:21 10:12   Pain   In / Out 0/10 0/10   Subjective Functional Status/Changes: Reports compliance with HEP. States she returns to work next Wednesday. TREATMENT AREA =  Pain in right knee [M25.561]    Next PN/ RC due 12/15/23  Auth due 23    OBJECTIVE    Vasopneumatic Device (UNTIMED), press/temp: medium/coldest setting    Involved limb - Pre-Treatment Girth: 44.0 cm  Post-Treatment Girth: 42.75 cm  [place]: mid patella      Min Rationale   10 decrease edema and decrease inflammation to improve patient's ability to progress to PLOF and address remaining functional goals. Skin assessment post-treatment:   Intact       Therapeutic Procedures: Tx Min Billable or 1:1 Min (if diff from Tx Min) Procedure, Rationale, Specifics   12  87662 Therapeutic Exercise (timed):  increase ROM, strength, coordination, balance, and proprioception to improve patient's ability to progress to PLOF and address remaining functional goals. (see flow sheet as applicable)     Details if applicable:       19  22988 Therapeutic Activity (timed):  use of dynamic activities replicating functional movements to increase ROM, strength, coordination, balance, and proprioception in order to improve patient's ability to progress to PLOF and address remaining functional goals.   (see flow sheet as applicable)     Details if applicable:     10  44285 Neuromuscular Re-Education (timed):  improve balance, coordination, kinesthetic sense, posture, core stability and proprioception to improve patient's ability to develop conscious control of individual muscles and awareness of position of extremities in order to progress to PLOF and address

## 2023-11-27 ENCOUNTER — APPOINTMENT (OUTPATIENT)
Facility: HOSPITAL | Age: 63
End: 2023-11-27
Payer: MEDICAID

## 2023-11-28 ENCOUNTER — APPOINTMENT (OUTPATIENT)
Facility: HOSPITAL | Age: 63
End: 2023-11-28
Payer: MEDICAID

## 2023-12-04 ENCOUNTER — HOSPITAL ENCOUNTER (OUTPATIENT)
Facility: HOSPITAL | Age: 63
Setting detail: RECURRING SERIES
Discharge: HOME | End: 2023-12-07
Payer: MEDICAID

## 2023-12-04 PROCEDURE — 97530 THERAPEUTIC ACTIVITIES: CPT

## 2023-12-04 PROCEDURE — 97110 THERAPEUTIC EXERCISES: CPT

## 2023-12-04 PROCEDURE — 97112 NEUROMUSCULAR REEDUCATION: CPT

## 2023-12-04 PROCEDURE — 97016 VASOPNEUMATIC DEVICE THERAPY: CPT

## 2023-12-04 NOTE — PROGRESS NOTES
PHYSICAL / OCCUPATIONAL THERAPY - DAILY TREATMENT NOTE (updated )    Patient Name: Caryle Dash    Date: 2023    : 1960  Insurance: Payor: Curt Foreman / Plan: [address] / Product Type: *No Product type* /      Patient  verified yes     Visit #   Current / Total 35 34-38   Time   In / Out 3:30 4:20   Pain   In / Out 0 0   Subjective Functional Status/Changes: Pt states she is still getting stiff when she sits for too long and leg is swelling more with working activity. Pt reports she is able to lift the heavy boxes, but it's not as easy as it used to be. Pt states she still doesn't feel confident walking down steps. Next PN/ RC due 12/15/23  Auth due 23    TREATMENT AREA =  Pain in right knee [M25.561]    OBJECTIVE    Vasopneumatic Device (UNTIMED), press/temp: medium/coldest setting    Involved limb - Pre-Treatment Girth: 44.3 cm  Post-Treatment Girth: 43.8 cm  [place]: mid patella      Min Rationale   10 decrease edema and decrease inflammation to improve patient's ability to progress to PLOF and address remaining functional goals. Skin assessment post-treatment:   Intact     Therapeutic Procedures: Tx Min Billable or 1:1 Min (if diff from Tx Min) Procedure, Rationale, Specifics   15  71610 Therapeutic Exercise (timed):  increase ROM, strength, coordination, balance, and proprioception to improve patient's ability to progress to PLOF and address remaining functional goals. (see flow sheet as applicable)     Details if applicable:       15  85294 Therapeutic Activity (timed):  use of dynamic activities replicating functional movements to increase ROM, strength, coordination, balance, and proprioception in order to improve patient's ability to progress to PLOF and address remaining functional goals.   (see flow sheet as applicable)     Details if applicable:     10  75252 Neuromuscular Re-Education (timed):  improve balance, coordination, kinesthetic sense, posture,

## 2023-12-05 ENCOUNTER — HOSPITAL ENCOUNTER (OUTPATIENT)
Facility: HOSPITAL | Age: 63
Setting detail: RECURRING SERIES
Discharge: HOME | End: 2023-12-08
Payer: MEDICAID

## 2023-12-05 PROCEDURE — 97016 VASOPNEUMATIC DEVICE THERAPY: CPT

## 2023-12-05 PROCEDURE — 97110 THERAPEUTIC EXERCISES: CPT

## 2023-12-05 PROCEDURE — 97530 THERAPEUTIC ACTIVITIES: CPT

## 2023-12-05 NOTE — PROGRESS NOTES
Physical Therapy Discharge Instructions      [facility] - [address]  [phone] fax      Patient: Nasreen Cervantes  : 1960      Continue Home Exercise Program  ROM exercises 7 days/week, all other exercises 3-4 times per week      Continue with    [x] Ice       [] Heat           Follow up with MD:     [] Upon completion of therapy     [x] As needed      Recommendations:     [x]   Return to activity with home program    []   Return to activity with the following modifications:       []Post Rehab Program    []Join Independent aquatic program     []Return to/join local gym        Additional Comments:           Janis Dahl, PT 2023 3:49 PM
address remaining functional goals. (see flow sheet as applicable)     Details if applicable:  box lifts   35  [facility] BC Totals Reminder: bill using total billable min of TIMED therapeutic procedures (example: do not include dry needle or estim unattended, both untimed codes, in totals to left)  8-22 min = 1 unit; 23-37 min = 2 units; 38-52 min = 3 units; 53-67 min = 4 units; 68-82 min = 5 units   Total Total     [x]  Patient Education billed concurrently with other procedures   [x] Review HEP    [] Progressed/Changed HEP, detail:    [] Other detail:       Objective Information/Functional Measures/Assessment    Patient has continued to progress with physical therapy. She has returned to work. She reports swelling after work shifts but states she elevates and ices her knee after work and sometimes during her work day. She has achieved all goals of therapy. She is being discharged at this time with a home exercise program.      Right knee AROM: 3 - 115 deg                                        Strength (0-5)  Hip Left Right   Flexion 5 5   Extension 4 4   Abduction 5 5   Adduction     ER     IR     Knee Left Right   Extension 5 5   Flexion 5 5         GOALS    Patient will  demonstrate ability to perform 10 reps SLR right LE without quad lag to  facilitate normal quad function with ADLs/IADLs, functional mobility and gait. Status at last Eval: quad lag right LE  Current Status: able to perform 10 reps of SLR with no quad lag   Goal Met?  yes    2. Patient will demonstrate ability to lift 40# with proper form to facilitate return to normal work duties. Status at last Eval: unable   Current Status: demonstrates lifting 40# box with proper body mechanics  Goal Met?  yes        RECOMMENDATIONS  Discontinue therapy. Progressing towards or have reached established goals.     Avinash Perera, PT       12/5/2023       3:24 PM    Thank you for this referral!